# Patient Record
Sex: FEMALE | Race: WHITE | NOT HISPANIC OR LATINO | Employment: FULL TIME | URBAN - NONMETROPOLITAN AREA
[De-identification: names, ages, dates, MRNs, and addresses within clinical notes are randomized per-mention and may not be internally consistent; named-entity substitution may affect disease eponyms.]

---
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
## 2022-01-06 ENCOUNTER — OFFICE VISIT (OUTPATIENT)
Dept: URGENT CARE | Facility: PHYSICIAN GROUP | Age: 22
End: 2022-01-06
Payer: COMMERCIAL

## 2022-01-06 VITALS
RESPIRATION RATE: 12 BRPM | DIASTOLIC BLOOD PRESSURE: 64 MMHG | WEIGHT: 216 LBS | HEIGHT: 63 IN | TEMPERATURE: 97.1 F | BODY MASS INDEX: 38.27 KG/M2 | HEART RATE: 72 BPM | OXYGEN SATURATION: 98 % | SYSTOLIC BLOOD PRESSURE: 108 MMHG

## 2022-01-06 DIAGNOSIS — R11.2 NAUSEA AND VOMITING, INTRACTABILITY OF VOMITING NOT SPECIFIED, UNSPECIFIED VOMITING TYPE: ICD-10-CM

## 2022-01-06 DIAGNOSIS — R10.9 BELLY PAIN: ICD-10-CM

## 2022-01-06 DIAGNOSIS — B34.9 VIRAL ILLNESS: ICD-10-CM

## 2022-01-06 DIAGNOSIS — R51.9 NONINTRACTABLE HEADACHE, UNSPECIFIED CHRONICITY PATTERN, UNSPECIFIED HEADACHE TYPE: ICD-10-CM

## 2022-01-06 LAB
APPEARANCE UR: CLEAR
BILIRUB UR STRIP-MCNC: NORMAL MG/DL
COLOR UR AUTO: YELLOW
EXTERNAL QUALITY CONTROL: NORMAL
GLUCOSE UR STRIP.AUTO-MCNC: NORMAL MG/DL
INT CON NEG: NORMAL
INT CON POS: NORMAL
KETONES UR STRIP.AUTO-MCNC: NORMAL MG/DL
LEUKOCYTE ESTERASE UR QL STRIP.AUTO: NORMAL
NITRITE UR QL STRIP.AUTO: NORMAL
PH UR STRIP.AUTO: 5.5 [PH] (ref 5–8)
POC URINE PREGNANCY TEST: NORMAL
PROT UR QL STRIP: NORMAL MG/DL
RBC UR QL AUTO: NORMAL
SARS-COV+SARS-COV-2 AG RESP QL IA.RAPID: NEGATIVE
SP GR UR STRIP.AUTO: 1.02
UROBILINOGEN UR STRIP-MCNC: 0.2 MG/DL

## 2022-01-06 PROCEDURE — 81002 URINALYSIS NONAUTO W/O SCOPE: CPT | Performed by: FAMILY MEDICINE

## 2022-01-06 PROCEDURE — 87426 SARSCOV CORONAVIRUS AG IA: CPT | Performed by: FAMILY MEDICINE

## 2022-01-06 PROCEDURE — 81025 URINE PREGNANCY TEST: CPT | Performed by: FAMILY MEDICINE

## 2022-01-06 PROCEDURE — 99214 OFFICE O/P EST MOD 30 MIN: CPT | Mod: 25 | Performed by: FAMILY MEDICINE

## 2022-01-06 RX ORDER — PROMETHAZINE HYDROCHLORIDE 25 MG/1
25 TABLET ORAL EVERY 8 HOURS PRN
Qty: 15 TABLET | Refills: 0 | Status: SHIPPED | OUTPATIENT
Start: 2022-01-06 | End: 2022-12-16

## 2022-01-06 ASSESSMENT — ENCOUNTER SYMPTOMS
HEADACHES: 1
NAUSEA: 1
ABDOMINAL PAIN: 1
VOMITING: 1
MYALGIAS: 1
FEVER: 1

## 2022-01-06 NOTE — LETTER
January 6, 2022         Patient: Eva Yi   YOB: 2000   Date of Visit: 1/6/2022           To Whom it May Concern:    Eva Yi was seen in my clinic on 1/6/2022. She may return to work in 2-3 days.    If you have any questions or concerns, please don't hesitate to call.        Sincerely,           Nathan Garcia M.D.  Electronically Signed

## 2022-01-06 NOTE — LETTER
January 6, 2022         Patient: Eva Yi   YOB: 2000   Date of Visit: 1/6/2022           To Whom it May Concern:    Eva Yi was seen in my clinic on 1/6/2022. She may return to work in 2-3 days, excuse and recent missed days of work this week.    If you have any questions or concerns, please don't hesitate to call.        Sincerely,           Nathan Garcia M.D.  Electronically Signed

## 2022-01-07 NOTE — PROGRESS NOTES
"Subjective     Eva Yi is a 21 y.o. female who presents with Vomiting (x2 dayd), Abdominal Pain, and Headache (x3 days)      - This is a pleasant and nontoxic appearing 21 y.o. female who has come to the walk-in clinic today for:    #1) ~3 days started w/ feeling sick (aches-malaise, headaches, subjective fever, upset stomach, nausea and vomited once). Able to eat/drink, no diarrhea. No cough/sinus.       ALLERGIES:  Pcn [penicillins]     PMH:  Past Medical History:   Diagnosis Date   • ADHD (attention deficit hyperactivity disorder)    • Allergy    • ASTHMA         PSH:  History reviewed. No pertinent surgical history.    MEDS:    Current Outpatient Medications:   •  promethazine (PHENERGAN) 25 MG Tab, Take 1 Tablet by mouth every 8 hours as needed for Nausea/Vomiting., Disp: 15 Tablet, Rfl: 0  •  Norgestim-Eth Estrad Triphasic (ORTHO TRI-CYCLEN, 28,) 0.18/0.215/0.25 MG-35 MCG Tab, Take 1 tablet by mouth every day. Begin medication the first day of your next menstrual period., Disp: 28 tablet, Rfl: 11  •  albuterol 108 (90 Base) MCG/ACT Aero Soln inhalation aerosol, Inhale 1-2 Puffs by mouth every 6 hours as needed for Shortness of Breath., Disp: 8.5 g, Rfl: 0    ** I have documented what I find to be significant in regards to past medical, social, family and surgical history  in my HPI or under PMH/PSH/FH review section, otherwise it is noncontributory **           HPI    Review of Systems   Constitutional: Positive for fever and malaise/fatigue.   Gastrointestinal: Positive for abdominal pain, nausea and vomiting.   Musculoskeletal: Positive for myalgias.   Neurological: Positive for headaches.   All other systems reviewed and are negative.             Objective     /64   Pulse 72   Temp 36.2 °C (97.1 °F)   Resp 12   Ht 1.6 m (5' 3\")   Wt 98 kg (216 lb) Comment: with shoes  SpO2 98%   BMI 38.26 kg/m²      Physical Exam  Vitals and nursing note reviewed.   Constitutional:       General: She is " not in acute distress.     Appearance: Normal appearance. She is well-developed.   HENT:      Head: Normocephalic and atraumatic.      Mouth/Throat:      Mouth: Mucous membranes are moist.      Pharynx: Oropharynx is clear.   Eyes:      General: No scleral icterus.  Cardiovascular:      Heart sounds: Normal heart sounds. No murmur heard.      Pulmonary:      Effort: Pulmonary effort is normal. No respiratory distress.      Breath sounds: Normal breath sounds.   Abdominal:      Palpations: Abdomen is soft.      Tenderness: There is no abdominal tenderness.   Skin:     Coloration: Skin is not jaundiced or pale.   Neurological:      Mental Status: She is alert.      Motor: No abnormal muscle tone.   Psychiatric:         Mood and Affect: Mood normal.         Behavior: Behavior normal.         Assessment & Plan       1. Nausea and vomiting, intractability of vomiting not specified, unspecified vomiting type  promethazine (PHENERGAN) 25 MG Tab   2. Nonintractable headache, unspecified chronicity pattern, unspecified headache type     3. Belly pain  POCT Urinalysis    POCT Pregnancy   4. Viral illness  POCT SARS-COV Antigen JUSTINE (Symptomatic Only)       - Dx, plan & d/c instructions discussed   - Rest, stay hydrated, OTC Motrin and/or Tylenol as needed  - E.R. precautions discussed     Asked to kindly follow up with their PCP's office in 2-3 days for a recheck, ER if not improving or feeling/getting worse.    Any realistic side effects of medications that may have been given today reviewed.     Patient left in stable condition     POCT results reviewed/discussed

## 2022-03-08 ENCOUNTER — OFFICE VISIT (OUTPATIENT)
Dept: MEDICAL GROUP | Facility: CLINIC | Age: 22
End: 2022-03-08
Payer: COMMERCIAL

## 2022-03-08 VITALS
OXYGEN SATURATION: 95 % | TEMPERATURE: 97.2 F | WEIGHT: 217.8 LBS | HEIGHT: 63 IN | DIASTOLIC BLOOD PRESSURE: 78 MMHG | HEART RATE: 91 BPM | RESPIRATION RATE: 20 BRPM | BODY MASS INDEX: 38.59 KG/M2 | SYSTOLIC BLOOD PRESSURE: 126 MMHG

## 2022-03-08 DIAGNOSIS — L30.9 ECZEMA, UNSPECIFIED TYPE: ICD-10-CM

## 2022-03-08 DIAGNOSIS — Z30.09 BIRTH CONTROL COUNSELING: ICD-10-CM

## 2022-03-08 PROCEDURE — 99214 OFFICE O/P EST MOD 30 MIN: CPT | Performed by: PHYSICIAN ASSISTANT

## 2022-03-08 RX ORDER — NORGESTIMATE AND ETHINYL ESTRADIOL 7DAYSX3 28
1 KIT ORAL DAILY
Qty: 90 TABLET | Refills: 3 | Status: SHIPPED | OUTPATIENT
Start: 2022-03-08 | End: 2023-02-01

## 2022-03-08 RX ORDER — TRIAMCINOLONE ACETONIDE 1 MG/G
OINTMENT TOPICAL
Qty: 150 G | Refills: 1 | Status: SHIPPED
Start: 2022-03-08 | End: 2023-01-10

## 2022-03-08 NOTE — PROGRESS NOTES
"cc:  Birth control    Subjective:     Eva Yi is a 21 y.o. female presenting for birth control      Patient presents to the office for birth control.  Patient indicates that she has not missed any doses with her birth control.  She is not having any side effects with the medication and is not having any spotting.  She states that she has been on the medication for a year.       Patient states that she has bad eczema on her hands. She did have a medication previously and states that nothing has worked.  She has seen dermatology in the past. She does wear gloves at work.  It becomes worse, then improves.  It has been rotating from better to bad for a year.  She cannot bend her finger in the morning as they are dry and crack.     Review of systems:  See above.   Denies any symptoms unless previously indicated.        Current Outpatient Medications:   •  Norgestim-Eth Estrad Triphasic (ORTHO TRI-CYCLEN, 28,) 0.18/0.215/0.25 MG-35 MCG Tab, Take 1 Tablet by mouth every day. Begin medication the first day of your next menstrual period., Disp: 90 Tablet, Rfl: 3  •  triamcinolone acetonide (KENALOG) 0.1 % Ointment, Apply a small amount to the affected area twice a day no longer than 2 weeks., Disp: 150 g, Rfl: 1  •  promethazine (PHENERGAN) 25 MG Tab, Take 1 Tablet by mouth every 8 hours as needed for Nausea/Vomiting. (Patient not taking: Reported on 3/8/2022), Disp: 15 Tablet, Rfl: 0  •  albuterol 108 (90 Base) MCG/ACT Aero Soln inhalation aerosol, Inhale 1-2 Puffs by mouth every 6 hours as needed for Shortness of Breath. (Patient not taking: Reported on 3/8/2022), Disp: 8.5 g, Rfl: 0    Allergies, past medical history, past surgical history, family history, social history reviewed and updated    Objective:     Vitals: /78 (BP Location: Left arm, Patient Position: Sitting, BP Cuff Size: Large adult)   Pulse 91   Temp 36.2 °C (97.2 °F) (Temporal)   Resp 20   Ht 1.6 m (5' 3\") Comment: stated by pt  Wt 98.8 kg " (217 lb 12.8 oz) Comment: with shoes on  LMP 03/01/2022 (Approximate)   SpO2 95%   BMI 38.58 kg/m²   General: Alert, pleasant, NAD  EYES:   PERRL, EOMI, no icterus or pallor.  Conjunctivae and lids normal.   HENT:  Normocephalic.  External ears normal.  Neck supple.     Respiratory: Normal respiratory effort.  Clear to auscultation bilaterally.  Abdomen: obese  Skin: Warm, dry, eczema hands bilaterally moderate to severe.  Musculoskeletal: Gait is normal.  Moves all extremities well.    Extremities: normal range of motion all extremities.   Neurological: No tremors, sensation grossly intact,  CN2-12 intact.  Psych:  Affect/mood is normal, judgement is good, memory is intact, grooming is appropriate.    Assessment/Plan:     Eva was seen today for medication refill.    Diagnoses and all orders for this visit:    Birth control counseling  -     Norgestim-Eth Estrad Triphasic (ORTHO TRI-CYCLEN, 28,) 0.18/0.215/0.25 MG-35 MCG Tab; Take 1 Tablet by mouth every day. Begin medication the first day of your next menstrual period.    Medication refilled at this time.  Provided education regarding Pap smear which will be done at the age of 23.    Eczema, unspecified type  -     Referral to Dermatology  -     triamcinolone acetonide (KENALOG) 0.1 % Ointment; Apply a small amount to the affected area twice a day no longer than 2 weeks.      We will submit a referral to dermatology.  Discussed treatment options with patient.  We will try triamcinolone ointment.  Patient aware of potential risks and side effects of medication.  Will let us know if she is unable to get in to see dermatology within the next few weeks.      No follow-ups on file.    Please note that this dictation was created using voice recognition software. I have made every reasonable attempt to correct obvious errors, but expect that there are errors of grammar and possible content that I did not discover before finalizing note.

## 2022-06-27 ENCOUNTER — APPOINTMENT (OUTPATIENT)
Dept: URGENT CARE | Facility: PHYSICIAN GROUP | Age: 22
End: 2022-06-27

## 2022-07-27 ENCOUNTER — OFFICE VISIT (OUTPATIENT)
Dept: URGENT CARE | Facility: PHYSICIAN GROUP | Age: 22
End: 2022-07-27
Payer: COMMERCIAL

## 2022-07-27 VITALS
BODY MASS INDEX: 39.51 KG/M2 | DIASTOLIC BLOOD PRESSURE: 84 MMHG | WEIGHT: 223 LBS | RESPIRATION RATE: 16 BRPM | HEIGHT: 63 IN | OXYGEN SATURATION: 98 % | HEART RATE: 111 BPM | SYSTOLIC BLOOD PRESSURE: 118 MMHG | TEMPERATURE: 97.8 F

## 2022-07-27 DIAGNOSIS — Z20.822 EXPOSURE TO COVID-19 VIRUS: ICD-10-CM

## 2022-07-27 DIAGNOSIS — J06.9 VIRAL URI WITH COUGH: ICD-10-CM

## 2022-07-27 DIAGNOSIS — U07.1 COVID-19: ICD-10-CM

## 2022-07-27 LAB
EXTERNAL QUALITY CONTROL: ABNORMAL
SARS-COV+SARS-COV-2 AG RESP QL IA.RAPID: POSITIVE

## 2022-07-27 PROCEDURE — 87426 SARSCOV CORONAVIRUS AG IA: CPT | Performed by: PHYSICIAN ASSISTANT

## 2022-07-27 PROCEDURE — 99213 OFFICE O/P EST LOW 20 MIN: CPT | Mod: CS | Performed by: PHYSICIAN ASSISTANT

## 2022-07-27 RX ORDER — SULFAMETHOXAZOLE AND TRIMETHOPRIM 800; 160 MG/1; MG/1
TABLET ORAL
COMMUNITY
Start: 2022-06-27 | End: 2022-12-16

## 2022-07-27 ASSESSMENT — ENCOUNTER SYMPTOMS
FEVER: 0
VOMITING: 0
EYE DISCHARGE: 0
EYE REDNESS: 0
NAUSEA: 1
SHORTNESS OF BREATH: 0
MYALGIAS: 1
HEADACHES: 1
SORE THROAT: 1
DIARRHEA: 1
COUGH: 1

## 2022-07-27 NOTE — PROGRESS NOTES
Subjective     Eva Yi is a 22 y.o. female who presents with Coronavirus Screening (/Body aches, headaches, hot and cold, at home covid test +, all symptoms began yesterday. )        URI   This is a new problem. The current episode started yesterday. The problem has been unchanged. There has been no fever. Associated symptoms include congestion, coughing, diarrhea, headaches, nausea, a plugged ear sensation and a sore throat. Pertinent negatives include no chest pain, ear pain, rash or vomiting. She has tried nothing for the symptoms.     The patient reports exposure to COVID-19, stating her parents (whom she lives with) recently tested positive for COVID-19.  The patient states she took an at-home COVID-19 test yesterday, which was positive.     PMH:  has a past medical history of ADHD (attention deficit hyperactivity disorder), Allergy, and ASTHMA.  MEDS:   Current Outpatient Medications:   •  Norgestim-Eth Estrad Triphasic (ORTHO TRI-CYCLEN, 28,) 0.18/0.215/0.25 MG-35 MCG Tab, Take 1 Tablet by mouth every day. Begin medication the first day of your next menstrual period., Disp: 90 Tablet, Rfl: 3  •  triamcinolone acetonide (KENALOG) 0.1 % Ointment, Apply a small amount to the affected area twice a day no longer than 2 weeks., Disp: 150 g, Rfl: 1  •  sulfamethoxazole-trimethoprim (BACTRIM DS) 800-160 MG tablet, TAKE 1 TABLET BY MOUTH TWICE DAILY FOR 5 DAYS (Patient not taking: Reported on 7/27/2022), Disp: , Rfl:   •  promethazine (PHENERGAN) 25 MG Tab, Take 1 Tablet by mouth every 8 hours as needed for Nausea/Vomiting. (Patient not taking: No sig reported), Disp: 15 Tablet, Rfl: 0  •  albuterol 108 (90 Base) MCG/ACT Aero Soln inhalation aerosol, Inhale 1-2 Puffs by mouth every 6 hours as needed for Shortness of Breath. (Patient not taking: No sig reported), Disp: 8.5 g, Rfl: 0  ALLERGIES:   Allergies   Allergen Reactions   • Pcn [Penicillins] Hives     SURGHX: No past surgical history on file.  SOCHX:   "reports that she has never smoked. She has never used smokeless tobacco. She reports that she does not drink alcohol and does not use drugs.  FH: Family history was reviewed, no pertinent findings to report    Review of Systems   Constitutional: Negative for fever.   HENT: Positive for congestion and sore throat. Negative for ear pain.    Eyes: Negative for discharge and redness.   Respiratory: Positive for cough. Negative for shortness of breath.    Cardiovascular: Negative for chest pain.   Gastrointestinal: Positive for diarrhea and nausea. Negative for vomiting.   Musculoskeletal: Positive for myalgias.   Skin: Negative for rash.   Neurological: Positive for headaches.              Objective     /84   Pulse (!) 111   Temp 36.6 °C (97.8 °F) (Temporal)   Resp 16   Ht 1.6 m (5' 3\")   Wt 101 kg (223 lb)   SpO2 98%   BMI 39.50 kg/m²      Physical Exam  Constitutional:       General: She is not in acute distress.     Appearance: Normal appearance. She is not ill-appearing.   HENT:      Head: Normocephalic and atraumatic.      Right Ear: External ear normal.      Left Ear: External ear normal.      Nose: Nose normal.      Mouth/Throat:      Mouth: Mucous membranes are moist.      Pharynx: Oropharynx is clear. No posterior oropharyngeal erythema.   Eyes:      Extraocular Movements: Extraocular movements intact.      Conjunctiva/sclera: Conjunctivae normal.   Cardiovascular:      Rate and Rhythm: Regular rhythm. Tachycardia present.      Heart sounds: Normal heart sounds.   Pulmonary:      Effort: Pulmonary effort is normal. No respiratory distress.      Breath sounds: Normal breath sounds. No wheezing.   Musculoskeletal:         General: Normal range of motion.      Cervical back: Normal range of motion and neck supple.   Skin:     General: Skin is warm and dry.   Neurological:      Mental Status: She is alert and oriented to person, place, and time.               Progress:  POCT Rapid COVID-19: POSITIVE    "              Assessment & Plan          1. Viral URI with cough  - POCT SARS-COV Antigen JUSTINE (Symptomatic only)    2. Exposure to COVID-19 virus    3. COVID-19    The patient's presenting symptoms and physical exam is are consistent with a viral URI with associated cough.  The patient reports recent exposure to COVID-19.  The patient's physical exam today in clinic was normal, with the exception of a mildly elevated heart rate.  The patient's lungs were clear to auscultation without wheezing, and her pulse ox was within normal limits.  The patient is nontoxic appears in no acute distress.  The patient's vital signs are stable and within normal limits.  She is afebrile today in clinic.  Discussed likely viral etiology with the patient.  The patient's POCT rapid COVID-19 testing was positive.  This is consistent with the patient's current symptoms and recent exposure.  Advised the patient to stay at home under self quarantine per CDC guidelines.  Recommend OTC medications and supportive care for symptomatic management.  Recommend patient follow-up with her PCP as needed.  Discussed return precautions with the patient, and she verbalized understanding.    Differential diagnoses, supportive care, and indications for immediate follow-up discussed with patient.   Instructed to return to clinic or nearest emergency department for any change in condition, further concerns, or worsening of symptoms.    OTC Tylenol or Motrin for fever/discomfort.  OTC cough/cold medication for symptomatic relief  OTC Supportive Care for Congestion - saline nasal spray or neti pot  Drink plenty of fluids  Advised the patient to stay at home under self-isolation until symptoms have been present for at least 5 days and are improved, and there has been no fever for at least 72 hours without the use of medications and/or no vomiting or diarrhea for 48 hours.  Work note provided  Follow-up with PCP  Return to clinic or go to the ED if symptoms  worsen or fail to improve, or if the patient should develop worsening/increasing cough, congestion, ear pain, sore throat, shortness of breath, wheezing, chest pain, fever/chills, and/or any concerning symptoms.    Discussed plan with the patient, and she agrees to the above.     I personally reviewed prior external notes and test results pertinent to today's visit.  I have independently reviewed and interpreted all diagnostics ordered during this urgent care visit.       Please note that this dictation was created using voice recognition software. I have made every reasonable attempt to correct obvious errors, but I expect that there may be errors of grammar and possibly content that I did not discover before finalizing the note.     This note was electronically signed by Lashell Luu PA-C

## 2022-07-27 NOTE — LETTER
July 27, 2022         Patient: Eva Yi   YOB: 2000   Date of Visit: 7/27/2022     To Whom it May Concern,     Your employee was seen in our clinic today.  A concern for COVID-19 was identified and your employee tested positive for COVID-19.      We are asking you to excuse absences while following self-isolation protocol per Center for Disease Control (CDC) guidelines.     In general, the CDC guidelines require a minimum quarantine of 5 days from the onset of symptoms.  If your employee's symptoms have significantly improved after 5 days without fever (temperature >100.4 F) for at least 72 hours without treatment, and no vomiting or diarrhea for at least 48 hours, then return to work is approved.  however, your employee is required to wear a mask and social distance for an additional 5 days.  If your employee is still experiencing symptoms at day 5 of quarantine, they are required to quarantine for an additional 5 days for a total of 10 days.    In general, repeat testing is not necessary and not offered through our [facility].      This is the only note that will be provided from [facility] for this visit.  Your employee will require an appointment with a primary care provider if FMLA or disability forms are required.       Sincerely,      Lashell Luu P.A.-C.  Electronically Signed

## 2022-12-16 ENCOUNTER — APPOINTMENT (OUTPATIENT)
Dept: RADIOLOGY | Facility: MEDICAL CENTER | Age: 22
End: 2022-12-16
Attending: EMERGENCY MEDICINE
Payer: COMMERCIAL

## 2022-12-16 ENCOUNTER — OFFICE VISIT (OUTPATIENT)
Dept: URGENT CARE | Facility: PHYSICIAN GROUP | Age: 22
End: 2022-12-16
Payer: COMMERCIAL

## 2022-12-16 ENCOUNTER — HOSPITAL ENCOUNTER (EMERGENCY)
Facility: MEDICAL CENTER | Age: 22
End: 2022-12-16
Attending: EMERGENCY MEDICINE
Payer: COMMERCIAL

## 2022-12-16 VITALS
BODY MASS INDEX: 37.77 KG/M2 | DIASTOLIC BLOOD PRESSURE: 67 MMHG | RESPIRATION RATE: 17 BRPM | HEIGHT: 63 IN | OXYGEN SATURATION: 95 % | TEMPERATURE: 98 F | SYSTOLIC BLOOD PRESSURE: 111 MMHG | WEIGHT: 213.19 LBS | HEART RATE: 85 BPM

## 2022-12-16 VITALS
HEIGHT: 63 IN | HEART RATE: 94 BPM | OXYGEN SATURATION: 98 % | DIASTOLIC BLOOD PRESSURE: 60 MMHG | BODY MASS INDEX: 37.92 KG/M2 | TEMPERATURE: 97 F | WEIGHT: 214 LBS | RESPIRATION RATE: 16 BRPM | SYSTOLIC BLOOD PRESSURE: 110 MMHG

## 2022-12-16 DIAGNOSIS — R50.9 FEVER AND CHILLS: ICD-10-CM

## 2022-12-16 DIAGNOSIS — R10.11 RUQ PAIN: ICD-10-CM

## 2022-12-16 DIAGNOSIS — B34.9 VIRAL ILLNESS: ICD-10-CM

## 2022-12-16 LAB
ALBUMIN SERPL BCP-MCNC: 4.5 G/DL (ref 3.2–4.9)
ALBUMIN/GLOB SERPL: 1.3 G/DL
ALP SERPL-CCNC: 104 U/L (ref 30–99)
ALT SERPL-CCNC: 22 U/L (ref 2–50)
ANION GAP SERPL CALC-SCNC: 15 MMOL/L (ref 7–16)
APPEARANCE UR: CLEAR
AST SERPL-CCNC: 19 U/L (ref 12–45)
BASOPHILS # BLD AUTO: 0.4 % (ref 0–1.8)
BASOPHILS # BLD: 0.03 K/UL (ref 0–0.12)
BILIRUB SERPL-MCNC: 0.2 MG/DL (ref 0.1–1.5)
BILIRUB UR QL STRIP.AUTO: NEGATIVE
BUN SERPL-MCNC: 9 MG/DL (ref 8–22)
CALCIUM ALBUM COR SERPL-MCNC: 9.2 MG/DL (ref 8.5–10.5)
CALCIUM SERPL-MCNC: 9.6 MG/DL (ref 8.4–10.2)
CHLORIDE SERPL-SCNC: 99 MMOL/L (ref 96–112)
CO2 SERPL-SCNC: 21 MMOL/L (ref 20–33)
COLOR UR: YELLOW
CREAT SERPL-MCNC: 0.56 MG/DL (ref 0.5–1.4)
EOSINOPHIL # BLD AUTO: 0 K/UL (ref 0–0.51)
EOSINOPHIL NFR BLD: 0 % (ref 0–6.9)
ERYTHROCYTE [DISTWIDTH] IN BLOOD BY AUTOMATED COUNT: 35.6 FL (ref 35.9–50)
FLUAV RNA SPEC QL NAA+PROBE: NEGATIVE
FLUBV RNA SPEC QL NAA+PROBE: NEGATIVE
GFR SERPLBLD CREATININE-BSD FMLA CKD-EPI: 134 ML/MIN/1.73 M 2
GLOBULIN SER CALC-MCNC: 3.6 G/DL (ref 1.9–3.5)
GLUCOSE SERPL-MCNC: 85 MG/DL (ref 65–99)
GLUCOSE UR STRIP.AUTO-MCNC: NEGATIVE MG/DL
HCG SERPL QL: NEGATIVE
HCT VFR BLD AUTO: 41.9 % (ref 37–47)
HGB BLD-MCNC: 14.2 G/DL (ref 12–16)
IMM GRANULOCYTES # BLD AUTO: 0.02 K/UL (ref 0–0.11)
IMM GRANULOCYTES NFR BLD AUTO: 0.3 % (ref 0–0.9)
KETONES UR STRIP.AUTO-MCNC: 15 MG/DL
LEUKOCYTE ESTERASE UR QL STRIP.AUTO: NEGATIVE
LIPASE SERPL-CCNC: 20 U/L (ref 7–58)
LYMPHOCYTES # BLD AUTO: 1.71 K/UL (ref 1–4.8)
LYMPHOCYTES NFR BLD: 21.9 % (ref 22–41)
MCH RBC QN AUTO: 28.7 PG (ref 27–33)
MCHC RBC AUTO-ENTMCNC: 33.9 G/DL (ref 33.6–35)
MCV RBC AUTO: 84.8 FL (ref 81.4–97.8)
MICRO URNS: ABNORMAL
MONOCYTES # BLD AUTO: 0.84 K/UL (ref 0–0.85)
MONOCYTES NFR BLD AUTO: 10.8 % (ref 0–13.4)
NEUTROPHILS # BLD AUTO: 5.21 K/UL (ref 2–7.15)
NEUTROPHILS NFR BLD: 66.6 % (ref 44–72)
NITRITE UR QL STRIP.AUTO: NEGATIVE
NRBC # BLD AUTO: 0 K/UL
NRBC BLD-RTO: 0 /100 WBC
PH UR STRIP.AUTO: 6 [PH] (ref 5–8)
PLATELET # BLD AUTO: 250 K/UL (ref 164–446)
PMV BLD AUTO: 9.7 FL (ref 9–12.9)
POTASSIUM SERPL-SCNC: 3.7 MMOL/L (ref 3.6–5.5)
PROT SERPL-MCNC: 8.1 G/DL (ref 6–8.2)
PROT UR QL STRIP: NEGATIVE MG/DL
RBC # BLD AUTO: 4.94 M/UL (ref 4.2–5.4)
RBC UR QL AUTO: NEGATIVE
RSV RNA SPEC QL NAA+PROBE: NEGATIVE
SARS-COV-2 RNA RESP QL NAA+PROBE: NOTDETECTED
SODIUM SERPL-SCNC: 135 MMOL/L (ref 135–145)
SP GR UR STRIP.AUTO: 1.01
SPECIMEN SOURCE: NORMAL
WBC # BLD AUTO: 7.8 K/UL (ref 4.8–10.8)

## 2022-12-16 PROCEDURE — 99214 OFFICE O/P EST MOD 30 MIN: CPT | Performed by: NURSE PRACTITIONER

## 2022-12-16 PROCEDURE — 80053 COMPREHEN METABOLIC PANEL: CPT

## 2022-12-16 PROCEDURE — 76705 ECHO EXAM OF ABDOMEN: CPT

## 2022-12-16 PROCEDURE — 36415 COLL VENOUS BLD VENIPUNCTURE: CPT

## 2022-12-16 PROCEDURE — 0241U HCHG SARS-COV-2 COVID-19 NFCT DS RESP RNA 4 TRGT MIC: CPT

## 2022-12-16 PROCEDURE — 99284 EMERGENCY DEPT VISIT MOD MDM: CPT

## 2022-12-16 PROCEDURE — 84703 CHORIONIC GONADOTROPIN ASSAY: CPT

## 2022-12-16 PROCEDURE — 81003 URINALYSIS AUTO W/O SCOPE: CPT

## 2022-12-16 PROCEDURE — 83690 ASSAY OF LIPASE: CPT

## 2022-12-16 PROCEDURE — C9803 HOPD COVID-19 SPEC COLLECT: HCPCS | Performed by: EMERGENCY MEDICINE

## 2022-12-16 PROCEDURE — 85025 COMPLETE CBC W/AUTO DIFF WBC: CPT

## 2022-12-16 RX ORDER — LIDOCAINE HYDROCHLORIDE 20 MG/ML
5 SOLUTION OROPHARYNGEAL PRN
Status: SHIPPED | OUTPATIENT
Start: 2022-12-16 | End: 2022-12-19

## 2022-12-16 RX ORDER — IBUPROFEN 800 MG/1
800 TABLET ORAL EVERY 8 HOURS PRN
Qty: 30 TABLET | Refills: 0 | Status: SHIPPED | OUTPATIENT
Start: 2022-12-16 | End: 2024-02-05

## 2022-12-16 NOTE — PROGRESS NOTES
Chief Complaint   Patient presents with    Chills    Headache     With Nausea. X 1 day hot and cold flashes, dizziness.     Oral Pain     Sore in and around mouth.        HISTORY OF PRESENT ILLNESS: Patient is a pleasant 22 y.o. female who presents to urgent care today with complaints of abdominal pain, nausea, fever, chills.  Her pain is generalized.  Denies any urinary symptoms, cough, congestion, sore throat.  She denies previous history of the same.  Denies any known ill contacts.  She does note some sores to the right side of her tongue as well.    Patient Active Problem List    Diagnosis Date Noted    Birth control counseling 05/03/2021    Sports physical 11/07/2018    Newly recognized heart murmur 11/07/2018    Overweight, pediatric, BMI (body mass index) 95-99% for age 09/12/2017    Mild intermittent asthma without complication 09/12/2017    Eczema 06/30/2016    Attention deficit hyperactivity disorder 06/30/2016    Seasonal allergies 04/26/2013       Allergies:Pcn [penicillins]    Current Outpatient Medications Ordered in Epic   Medication Sig Dispense Refill    Norgestim-Eth Estrad Triphasic (ORTHO TRI-CYCLEN, 28,) 0.18/0.215/0.25 MG-35 MCG Tab Take 1 Tablet by mouth every day. Begin medication the first day of your next menstrual period. 90 Tablet 3    albuterol 108 (90 Base) MCG/ACT Aero Soln inhalation aerosol Inhale 1-2 Puffs by mouth every 6 hours as needed for Shortness of Breath. 8.5 g 0    triamcinolone acetonide (KENALOG) 0.1 % Ointment Apply a small amount to the affected area twice a day no longer than 2 weeks. (Patient not taking: Reported on 12/16/2022) 150 g 1     No current [facility]-ordered facility-administered medications on file.       Past Medical History:   Diagnosis Date    ADHD (attention deficit hyperactivity disorder)     Allergy     ASTHMA        Social History     Tobacco Use    Smoking status: Never    Smokeless tobacco: Never   Vaping Use    Vaping Use: Never used   Substance Use  "Topics    Alcohol use: No     Alcohol/week: 0.0 oz    Drug use: No       Family Status   Relation Name Status    Mo  Alive    Fa  Alive    Sis  Alive    Bro  Alive    PGMo  Alive    PGFa      Sis  Alive     Family History   Problem Relation Age of Onset    Alcohol/Drug Mother     Psychiatric Illness Mother     Other Mother     No Known Problems Father     Hypertension Paternal Grandmother        ROS:  Review of Systems   Constitutional: Positive for fever, chills, body aches, malaise, and fatigue.   HENT: Positive for oral pain.  Negative for ear pain, nosebleeds, congestion, sore throat and neck pain.    Eyes: Negative for vision changes.   Neuro: Negative for headache, sensory changes, weakness, seizure, LOC.   Cardiovascular: Negative for chest pain, palpitations, orthopnea and leg swelling.   Respiratory: Negative for cough, sputum production, shortness of breath and wheezing.   Gastrointestinal: Positive for abdominal pain, nausea.  Negative for diarrhea.   Genitourinary: Negative for dysuria, urgency and frequency.  Musculoskeletal: Negative for falls, neck pain, back pain, joint pain, myalgias.   Skin: Negative for rash, diaphoresis.     Exam:  /60   Pulse 94   Temp 36.1 °C (97 °F) (Temporal)   Resp 16   Ht 1.6 m (5' 3\")   Wt 97.1 kg (214 lb)   SpO2 98%   General: well-nourished, well-developed female in NAD  Head: normocephalic, atraumatic  Eyes: PERRLA, no conjunctival injection, acuity grossly intact, lids normal.  Ears: normal shape and symmetry, no tenderness, no discharge. External canals are without any significant edema or erythema. Tympanic membranes are without any inflammation, no effusion. Gross auditory acuity is intact.  Nose: symmetrical without tenderness, no discharge.  Mouth/Throat: reasonable hygiene, no erythema, exudates or tonsillar enlargement.  Small ulcerated lesions noted to right side of tongue.  Neck: no masses, range of motion within normal limits, no tracheal " deviation. No obvious thyroid enlargement.   Lymph: no cervical adenopathy. No supraclavicular adenopathy.   Neuro: alert and oriented. Cranial nerves 1-12 grossly intact. No sensory deficit.   Cardiovascular: regular rate and rhythm. No edema.  Pulmonary: no distress. Chest is symmetrical with respiration, no wheezes, crackles, or rhonchi.   Abdomen: soft, right upper quadrant tenderness with guarding, no hepatosplenomegaly.  Musculoskeletal: no clubbing, appropriate muscle tone, gait is stable.  Skin: warm, dry, intact, no clubbing, no cyanosis, no rashes.   Psych: appropriate mood, affect, judgement.         Assessment/Plan:  1. RUQ pain        2. Fever and chills              The patient is a 22-year-old female who presents with generalized abdominal pain along with fever and chills for the past 2 days.  Upon examination the patient does have right upper quadrant tenderness with guarding. Differential diagnoses include but are not limited to: viral process, cholelithiasis, cholecystitis.  Unfortunately we do not have influenza testing available to rule out viral process in the urgent care.  Nevertheless, at this time, I feel the patient requires a higher level of care in the ED for closer monitoring, stat lab work and/or imaging for further evaluation. This has been discussed with the patient and she states agreement and understanding. The patient is stable to leave [facility] at this time and will go directly to ED without delay.           Please note that this dictation was created using voice recognition software. I have made every reasonable attempt to correct obvious errors, but I expect that there are errors of grammar and possibly content that I did not discover before finalizing the note.      JS Maciel.

## 2022-12-16 NOTE — ED TRIAGE NOTES
"Chief Complaint   Patient presents with    Abdominal Pain    Nausea    Diarrhea    Fever     Tmax 99.7.  Above symptoms x2 days.  Went to u/c in [place] and upon palpation severe RUQ pain.  Urgent care sent her here.      BP (!) 137/94   Pulse 86   Temp 36.7 °C (98 °F) (Temporal)   Resp 18   Ht 1.6 m (5' 3\")   Wt 96.7 kg (213 lb 3 oz)   LMP 12/01/2022   SpO2 97%   BMI 37.76 kg/m²     "

## 2022-12-17 NOTE — ED PROVIDER NOTES
ED Provider Note    CHIEF COMPLAINT  Chief Complaint   Patient presents with    Abdominal Pain    Nausea    Diarrhea    Fever     Tmax 99.7.  Above symptoms x2 days.  Went to u/c in [place] and upon palpation severe RUQ pain.  Urgent care sent her here.        LIMITATION TO HISTORY   Select: : None    HPI  Eva Yi is a 22 y.o. female who presents here for evaluation of abdominal pain.  Patient states that she has had some nausea and diarrhea over the last few days, and states it is been persistent.  She has right upper quadrant abdominal pain, that is nonradiating, and not alleviated by anything in particular.  She was seen and evaluated by the urgent care, who told her to come here for an ultrasound after their exam showed some pain with the right upper quadrant.  She has no lower abdominal pain, no chest pain, and no shortness of breath.  She has fevers around 99.7 per her, but nothing higher than that.    OUTSIDE HISTORIAN(S):  Select: Significant other      EXTERNAL RECORDS REVIEWED  Select: Other none    REVIEW OF SYSTEMS  See HPI for further details. All other systems are negative.     PAST MEDICAL HISTORY   has a past medical history of ADHD (attention deficit hyperactivity disorder), Allergy, and ASTHMA.    SOCIAL HISTORY  Social History     Tobacco Use    Smoking status: Never    Smokeless tobacco: Never   Vaping Use    Vaping Use: Never used   Substance and Sexual Activity    Alcohol use: No     Alcohol/week: 0.0 oz    Drug use: No    Sexual activity: Never       SURGICAL HISTORY  patient denies any surgical history    CURRENT MEDICATIONS  Home Medications       Reviewed by Desirae Smith R.N. (Registered Nurse) on 12/16/22 at 1402  Med List Status: Not Addressed     Medication Last Dose Status   albuterol 108 (90 Base) MCG/ACT Aero Soln inhalation aerosol  Active   Norgestim-Eth Estrad Triphasic (ORTHO TRI-CYCLEN, 28,) 0.18/0.215/0.25 MG-35 MCG Tab  Active   triamcinolone acetonide (KENALOG) 0.1 %  "Ointment  Active                    ALLERGIES  Allergies   Allergen Reactions    Pcn [Penicillins] Hives       PHYSICAL EXAM  VITAL SIGNS: BP (!) 137/94   Pulse 86   Temp 36.7 °C (98 °F) (Temporal)   Resp 18   Ht 1.6 m (5' 3\")   Wt 96.7 kg (213 lb 3 oz)   LMP 12/01/2022   SpO2 97%   BMI 37.76 kg/m²    Constitutional: Well developed, well nourished. No acute distress.  HEENT: Normocephalic, atraumatic. Posterior pharynx clear and moist.  Eyes:  EOMI. Normal sclera.  Neck: Supple, Full range of motion, nontender.  Chest/Pulmonary: clear to ausculation. Symmetrical expansion.   Cardio: Regular rate and rhythm with no murmur.   Abdomen: Soft, tenderness to the right upper quadrant, No peritoneal signs. No guarding. No palpable masses.  Musculoskeletal: No deformity, no edema, neurovascular intact.   Neuro: Clear speech, appropriate, cooperative, cranial nerves II-XII grossly intact.  Psych: Normal mood and affect        DIAGNOSTIC STUDIES / PROCEDURES    EKG  none    LABS  Results for orders placed or performed during the hospital encounter of 12/16/22   CBC WITH DIFFERENTIAL   Result Value Ref Range    WBC 7.8 4.8 - 10.8 K/uL    RBC 4.94 4.20 - 5.40 M/uL    Hemoglobin 14.2 12.0 - 16.0 g/dL    Hematocrit 41.9 37.0 - 47.0 %    MCV 84.8 81.4 - 97.8 fL    MCH 28.7 27.0 - 33.0 pg    MCHC 33.9 33.6 - 35.0 g/dL    RDW 35.6 (L) 35.9 - 50.0 fL    Platelet Count 250 164 - 446 K/uL    MPV 9.7 9.0 - 12.9 fL    Neutrophils-Polys 66.60 44.00 - 72.00 %    Lymphocytes 21.90 (L) 22.00 - 41.00 %    Monocytes 10.80 0.00 - 13.40 %    Eosinophils 0.00 0.00 - 6.90 %    Basophils 0.40 0.00 - 1.80 %    Immature Granulocytes 0.30 0.00 - 0.90 %    Nucleated RBC 0.00 /100 WBC    Neutrophils (Absolute) 5.21 2.00 - 7.15 K/uL    Lymphs (Absolute) 1.71 1.00 - 4.80 K/uL    Monos (Absolute) 0.84 0.00 - 0.85 K/uL    Eos (Absolute) 0.00 0.00 - 0.51 K/uL    Baso (Absolute) 0.03 0.00 - 0.12 K/uL    Immature Granulocytes (abs) 0.02 0.00 - 0.11 K/uL " "   NRBC (Absolute) 0.00 K/uL   COMP METABOLIC PANEL   Result Value Ref Range    Sodium 135 135 - 145 mmol/L    Potassium 3.7 3.6 - 5.5 mmol/L    Chloride 99 96 - 112 mmol/L    Co2 21 20 - 33 mmol/L    Anion Gap 15.0 7.0 - 16.0    Glucose 85 65 - 99 mg/dL    Bun 9 8 - 22 mg/dL    Creatinine 0.56 0.50 - 1.40 mg/dL    Calcium 9.6 8.4 - 10.2 mg/dL    AST(SGOT) 19 12 - 45 U/L    ALT(SGPT) 22 2 - 50 U/L    Alkaline Phosphatase 104 (H) 30 - 99 U/L    Total Bilirubin 0.2 0.1 - 1.5 mg/dL    Albumin 4.5 3.2 - 4.9 g/dL    Total Protein 8.1 6.0 - 8.2 g/dL    Globulin 3.6 (H) 1.9 - 3.5 g/dL    A-G Ratio 1.3 g/dL   LIPASE   Result Value Ref Range    Lipase 20 7 - 58 U/L   URINALYSIS,CULTURE IF INDICATED    Specimen: Urine, Clean Catch; Blood   Result Value Ref Range    Color Yellow     Character Clear     Specific Gravity 1.010 <1.035    Ph 6.0 5.0 - 8.0    Glucose Negative Negative mg/dL    Ketones 15 (A) Negative mg/dL    Protein Negative Negative mg/dL    Bilirubin Negative Negative    Nitrite Negative Negative    Leukocyte Esterase Negative Negative    Occult Blood Negative Negative    Micro Urine Req see below    HCG QUAL SERUM   Result Value Ref Range    Beta-Hcg Qualitative Serum Negative Negative   CORRECTED CALCIUM   Result Value Ref Range    Correct Calcium 9.2 8.5 - 10.5 mg/dL   ESTIMATED GFR   Result Value Ref Range    GFR (CKD-EPI) 134 >60 mL/min/1.73 m 2         RADIOLOGY  US-RUQ   Final Result      1.  Hepatomegaly with evidence of hepatic steatosis.              COURSE & MEDICAL DECISION MAKING  Pertinent Labs & Imaging studies reviewed. (See chart for details)  5:54 PM  This time, the patient is nontoxic-appearing, and afebrile.  She states she feels \"really cold\" despite being under blankets.  She also states that she does have some mild body aches.  Her lab work is unremarkable, her pregnancy test is negative, and her ultrasound is negative.  Patient states that she has no pain other than when pushing on her " upper abdomen, right more than left-sided.  She has no other medical concerns at this time.  I did offer to do a CT scan on the patient, she states that she would rather have a flu test done, medications for pain and discomfort, and she will return here for any further issues or concerns.  Patient is appropriate for outpatient management.  I reviewed the studies of lab work and ultrasound.  She is appropriate for outpatient services            DISCUSSION OF MANAGEMENT WITH OTHER PHYSICIANS, HP OR APPROPRIATE SOURCE  Select: None    INDEPENDENT INTERPRETATION OF STUDIES  Select: Ultrasound(s)      ESCALATION OF CARE  Appropriate for outpatient management patient is appropriate for outpatient services    SOCIAL DETERMINANTS THAT SIGNIFICANTLY AFFECT CARE  Select: None    PRESCRIPTION DRUG CONSIDERED  Select: Pain Medications      DIAGNOSTICS TESTS CONSIDERED  We did entertain the idea of doing a CT scan, however patient states that she would rather go home and see how she does, prior to having 1 done.         @@    The patient will not drink alcohol nor drive with prescribed medications. The patient will return for worsening symptoms and is stable at the time of discharge. The patient verbalizes understanding and will comply.    FINAL IMPRESSION  1. Abdominal pain          Electronically signed by: Benitez Eubanks D.O., 12/16/2022 4:26 PM

## 2022-12-18 ENCOUNTER — HOSPITAL ENCOUNTER (EMERGENCY)
Facility: MEDICAL CENTER | Age: 22
End: 2022-12-18
Attending: EMERGENCY MEDICINE
Payer: COMMERCIAL

## 2022-12-18 VITALS
HEIGHT: 63 IN | HEART RATE: 82 BPM | DIASTOLIC BLOOD PRESSURE: 78 MMHG | WEIGHT: 212.08 LBS | OXYGEN SATURATION: 95 % | BODY MASS INDEX: 37.58 KG/M2 | RESPIRATION RATE: 16 BRPM | SYSTOLIC BLOOD PRESSURE: 123 MMHG | TEMPERATURE: 98.1 F

## 2022-12-18 DIAGNOSIS — B00.2 HERPETIC GINGIVOSTOMATITIS: ICD-10-CM

## 2022-12-18 PROCEDURE — 99282 EMERGENCY DEPT VISIT SF MDM: CPT

## 2022-12-18 RX ORDER — HYDROCODONE BITARTRATE AND ACETAMINOPHEN 5; 325 MG/1; MG/1
1 TABLET ORAL EVERY 6 HOURS PRN
Qty: 12 TABLET | Refills: 0 | Status: SHIPPED | OUTPATIENT
Start: 2022-12-18 | End: 2022-12-23

## 2022-12-18 RX ORDER — ACYCLOVIR 200 MG/1
400 CAPSULE ORAL
Qty: 50 CAPSULE | Refills: 0 | Status: SHIPPED | OUTPATIENT
Start: 2022-12-18 | End: 2022-12-23

## 2022-12-18 ASSESSMENT — FIBROSIS 4 INDEX: FIB4 SCORE: 0.32

## 2022-12-18 NOTE — ED PROVIDER NOTES
"ED Provider Note    CHIEF COMPLAINT  Chief Complaint   Patient presents with    Sore Throat     Sore throat, sores in mouth, swollen tonsils and neck and head pain.       HPI  Eva Yi is a 22 y.o. female who presents for evaluation of sore throat.  The patient states for the last 2 days she has developed \"canker sores\".  The patient complains of painful lesions over her lips and her throat.  Patient was seen a couple days ago for abdominal pain and the work-up was unremarkable.  Patient's had a low-grade fever.  She has had no significant: Purulent rhinorrhea, cough, sputum, shortness of breath, vomiting, diarrhea.  No rashes.  No other acute symptomatology or complaints.    REVIEW OF SYSTEMS  See HPI for further details.  No major health problems such as hypertension, diabetes, seizures, cardiac disorders, gastrointestinal disorders, genitourinary disorders.  She denies pregnancy.  All other systems negative.    PAST MEDICAL HISTORY  Past Medical History:   Diagnosis Date    ADHD (attention deficit hyperactivity disorder)     Allergy     ASTHMA        FAMILY HISTORY  Family History   Problem Relation Age of Onset    Alcohol/Drug Mother     Psychiatric Illness Mother     Other Mother     No Known Problems Father     Hypertension Paternal Grandmother        SOCIAL HISTORY  Resides in [place], [place]    SURGICAL HISTORY  History reviewed. No pertinent surgical history.    CURRENT MEDICATIONS  See his notes    ALLERGIES  Allergies   Allergen Reactions    Pcn [Penicillins] Hives       PHYSICAL EXAM  VITAL SIGNS: BP (!) 142/91   Pulse 84   Temp 36.6 °C (97.8 °F) (Temporal)   Resp 16   Ht 1.6 m (5' 3\")   Wt 96.2 kg (212 lb 1.3 oz)   LMP 12/01/2022   SpO2 96%   BMI 37.57 kg/m²    Constitutional: 22-year-old female, well developed, Well nourished, phonation is normal no respiratory distress  HENT: Normocephalic, Atraumatic, Nares:Clear, Oropharynx: moist, well hydrated; the patient has oval ulcerative lesions " over the inner lips and posterior pharynx with no associated exudate;  Eyes: PERRL, EOMI, Conjunctiva normal, No discharge.   Neck: Normal range of motion, No tenderness, Supple, No stridor.   Lymphatic: Shotty semitubular lymphadenopathy noted.   Cardiovascular: Regular rate and rhythm without mumurs, gallups, rubs   Thorax & Lungs: Normal Equal breath sounds, No respiratory distress, No wheezing, no stridor, no rales. No chest tenderness.   Abdomen: Soft, nontender, nondistended, no organomegaly, positive bowel sounds normal in quality. No guarding or rebound.  Skin: Good skin turgor, pink, warm, dry. No rashes, petechiae, purpura. Normal capillary refill.   Back: No tenderness, No CVA tenderness.   Extremities: Intact distal pulses, No edema, No tenderness, No cyanosis,  Vascular: Pulses are 2+, symmetric in the upper and lower extremities.  Musculoskeletal: Good range of motion in all major joints. No tenderness to palpation or major deformities noted.   Neurologic: Alert & oriented x 3,  No gross focal deficits noted.   Psychiatric: Affect normal, Judgment normal, Mood normal.       RADIOLOGY/PROCEDURES  Previous imaging studies reviewed    COURSE & MEDICAL DECISION MAKING  Pertinent Labs & Imaging studies reviewed. (See chart for details)  Discussion: At this time, the patient presents with findings consistent with herpetic gingivostomatitis.  I discussed condition with the patient.  I will attempt a trial of antiviral therapy.  She instructed to keep her self well-hydrated instructions regarding this were given.  Over-the-counter medications were discussed.  I see no indication for emergent laboratory studies or imaging studies.  She and her  indicate they are comfortable with this explanation disposition.    FINAL IMPRESSION  1. Herpetic gingivostomatitis           PLAN  1.  Appropriate discharge instructions given  2.  Acyclovir 400 mg 5 times daily x5 days  3.  Lortab 5 mg #12;  databank  reviewed; informed consent obtained  4.  Tylenol and/or ibuprofen for pain  5.  Encouraging hydration and oral intake  6.  Follow-up with primary care    Electronically signed by: Guy G Gansert, M.D., 12/18/2022 11:23 AM

## 2022-12-19 ENCOUNTER — OFFICE VISIT (OUTPATIENT)
Dept: MEDICAL GROUP | Facility: CLINIC | Age: 22
End: 2022-12-19
Payer: COMMERCIAL

## 2022-12-19 VITALS
HEIGHT: 64 IN | BODY MASS INDEX: 35.68 KG/M2 | SYSTOLIC BLOOD PRESSURE: 126 MMHG | DIASTOLIC BLOOD PRESSURE: 84 MMHG | TEMPERATURE: 97.4 F | HEART RATE: 100 BPM | RESPIRATION RATE: 16 BRPM | OXYGEN SATURATION: 93 % | WEIGHT: 209 LBS

## 2022-12-19 DIAGNOSIS — K59.09 OTHER CONSTIPATION: ICD-10-CM

## 2022-12-19 DIAGNOSIS — B00.2 HERPETIC GINGIVOSTOMATITIS: ICD-10-CM

## 2022-12-19 DIAGNOSIS — R07.89 OTHER CHEST PAIN: ICD-10-CM

## 2022-12-19 DIAGNOSIS — N92.6 IRREGULAR PERIODS: ICD-10-CM

## 2022-12-19 DIAGNOSIS — N92.6 MENSTRUAL ABNORMALITY: ICD-10-CM

## 2022-12-19 PROBLEM — Z02.5 SPORTS PHYSICAL: Status: RESOLVED | Noted: 2018-11-07 | Resolved: 2022-12-19

## 2022-12-19 PROCEDURE — 93000 ELECTROCARDIOGRAM COMPLETE: CPT | Performed by: PHYSICIAN ASSISTANT

## 2022-12-19 PROCEDURE — 99214 OFFICE O/P EST MOD 30 MIN: CPT | Performed by: PHYSICIAN ASSISTANT

## 2022-12-19 RX ORDER — LIDOCAINE HYDROCHLORIDE 20 MG/ML
SOLUTION OROPHARYNGEAL
Qty: 1000 ML | Refills: 0 | Status: SHIPPED | OUTPATIENT
Start: 2022-12-19 | End: 2022-12-20 | Stop reason: SDUPTHER

## 2022-12-19 RX ORDER — DOCUSATE SODIUM 100 MG/1
200 CAPSULE, LIQUID FILLED ORAL 2 TIMES DAILY
Qty: 60 CAPSULE | Refills: 0 | Status: SHIPPED
Start: 2022-12-19 | End: 2023-01-10

## 2022-12-19 ASSESSMENT — PATIENT HEALTH QUESTIONNAIRE - PHQ9: CLINICAL INTERPRETATION OF PHQ2 SCORE: 0

## 2022-12-19 ASSESSMENT — FIBROSIS 4 INDEX: FIB4 SCORE: 0.32

## 2022-12-19 NOTE — PROGRESS NOTES
cc:  ER follow up    Subjective:     Eva iY is a 22 y.o. female presenting for ER follow up      Patient presents to the office for ER follow up. She was seen on the 16th and then on the 18th.  She was seen for abdominal pain on the 16th but was having symptoms with her mouth at that time.  She went back to the ER as her mouth was feeling worse and was diagnosed with herpetic gingivostomatitis.  She did have an ultrasound with the first visit. ultrasound reports an enlarged liver.  She states that the only time she had pain was when the urgent care provider pushed on her abdomen and she had pain.  She was diagnosed with hepatomegaly and hepatic steatosis.  Last night she states was the first time she did not feel feverish. She has been having pain for 3 days.    Patient states that she has been having chest pain and states that she has not been able to have a bowel movemement.  She states that she does not have chest pain every time she breathes.      Review of systems:  See above.   Denies any symptoms unless previously indicated.        Current Outpatient Medications:     lidocaine (XYLOCAINE) 2 % Solution, Swish and spit 5 mls every 3 hours as needed for pain, Disp: 1000 mL, Rfl: 0    docusate sodium (COLACE) 100 MG Cap, Take 2 Capsules by mouth 2 times a day., Disp: 60 Capsule, Rfl: 0    acyclovir (ZOVIRAX) 200 MG Cap, Take 2 Capsules by mouth 5 Times a Day for 5 days., Disp: 50 Capsule, Rfl: 0    HYDROcodone-acetaminophen (NORCO) 5-325 MG Tab per tablet, Take 1 Tablet by mouth every 6 hours as needed (pain) for up to 5 days., Disp: 12 Tablet, Rfl: 0    ibuprofen (MOTRIN) 800 MG Tab, Take 1 Tablet by mouth every 8 hours as needed for Moderate Pain., Disp: 30 Tablet, Rfl: 0    Norgestim-Eth Estrad Triphasic (ORTHO TRI-CYCLEN, 28,) 0.18/0.215/0.25 MG-35 MCG Tab, Take 1 Tablet by mouth every day. Begin medication the first day of your next menstrual period., Disp: 90 Tablet, Rfl: 3    triamcinolone acetonide  "(KENALOG) 0.1 % Ointment, Apply a small amount to the affected area twice a day no longer than 2 weeks. (Patient not taking: Reported on 12/16/2022), Disp: 150 g, Rfl: 1    albuterol 108 (90 Base) MCG/ACT Aero Soln inhalation aerosol, Inhale 1-2 Puffs by mouth every 6 hours as needed for Shortness of Breath. (Patient not taking: Reported on 12/19/2022), Disp: 8.5 g, Rfl: 0    Current Facility-Administered Medications:     lidocaine (XYLOCAINE) 2 % viscous solution 5 mL, 5 mL, Mouth/Throat, PRN, Benitez Eubanks D.O.    Allergies, past medical history, past surgical history, family history, social history reviewed and updated    Objective:     Vitals: /84 (BP Location: Left arm, Patient Position: Sitting, BP Cuff Size: Adult)   Pulse 100   Temp 36.3 °C (97.4 °F) (Temporal)   Resp 16   Ht 1.613 m (5' 3.5\")   Wt 94.8 kg (209 lb)   LMP 12/19/2022   SpO2 93%   BMI 36.44 kg/m²   General: Alert, pleasant, NAD  EYES:   PERRL, EOMI, no icterus or pallor.  Conjunctivae and lids normal.   HENT:  Normocephalic.  External ears normal.  Oropharynx erythematous with herpetic outbreak on right side of mouth and tongue.   mucous membranes moist.  Neck supple.     Heart: Regular rate and rhythm.  S1 and S2 normal.  No murmurs appreciated.  Respiratory: Normal respiratory effort.  Clear to auscultation bilaterally.  Abdomen: obese  Skin: Warm, dry, no rashes.  Musculoskeletal: Gait is normal.  Moves all extremities well.    Extremities: normal range of motion all extremities.   Neurological: No tremors, sensation grossly intact,  CN2-12 intact.  Psych:  Affect/mood is normal, judgement is good, memory is intact, grooming is appropriate.  EKG:  normal sinus normal rhythm.  Borderline TN prolonged interval.    Assessment/Plan:     Eva was seen today for spotting and cold sores.    Diagnoses and all orders for this visit:    Herpetic gingivostomatitis  -     lidocaine (XYLOCAINE) 2 % Solution; Swish and spit 5 mls every " 3 hours as needed for pain    Patient is currently on acyclovir.  It appears that lidocaine has been ordered but not submitted.  We will go ahead and submit this prescription at this time and follow-up in 2 weeks.  A note to remain out of work for the remainder of the week.  She will contact us sooner if no significant improvement in symptoms or worsening.    Menstrual abnormality  -     Cancel: POCT Pregnancy  Irregular periods  -     TSH WITH REFLEX TO FT4; Future    Potentially related to illness.  Pregnancy test in the ER is negative.  Could potentially be related to medication.  Thyroid testing has been ordered to evaluate further and will notify patient of results when received.    Other chest pain  -     EKG - Clinic Performed  Other constipation  -     docusate sodium (COLACE) 100 MG Cap; Take 2 Capsules by mouth 2 times a day.    Borderline prolonged TN interval.  Otherwise negative EKG.  Could be due to constipation as well we will try patient on Colace.  Follow-up in 2 weeks if not improved, will need to evaluate further.      Return in about 2 weeks (around 1/2/2023), or if symptoms worsen or fail to improve.    Please note that this dictation was created using voice recognition software. I have made every reasonable attempt to correct obvious errors, but expect that there are errors of grammar and possible content that I did not discover before finalizing note.

## 2022-12-20 ENCOUNTER — HOSPITAL ENCOUNTER (OUTPATIENT)
Facility: MEDICAL CENTER | Age: 22
End: 2022-12-20
Attending: PHYSICIAN ASSISTANT
Payer: COMMERCIAL

## 2022-12-20 ENCOUNTER — TELEPHONE (OUTPATIENT)
Dept: MEDICAL GROUP | Facility: CLINIC | Age: 22
End: 2022-12-20

## 2022-12-20 ENCOUNTER — NON-PROVIDER VISIT (OUTPATIENT)
Dept: MEDICAL GROUP | Facility: CLINIC | Age: 22
End: 2022-12-20
Payer: COMMERCIAL

## 2022-12-20 DIAGNOSIS — B00.2 HERPETIC GINGIVOSTOMATITIS: ICD-10-CM

## 2022-12-20 DIAGNOSIS — N92.6 IRREGULAR PERIODS: ICD-10-CM

## 2022-12-20 PROCEDURE — 36415 COLL VENOUS BLD VENIPUNCTURE: CPT | Performed by: PHYSICIAN ASSISTANT

## 2022-12-20 PROCEDURE — 84443 ASSAY THYROID STIM HORMONE: CPT

## 2022-12-20 RX ORDER — LIDOCAINE HYDROCHLORIDE 20 MG/ML
SOLUTION OROPHARYNGEAL
Qty: 100 ML | Refills: 0 | Status: SHIPPED
Start: 2022-12-20 | End: 2023-01-10

## 2022-12-20 NOTE — TELEPHONE ENCOUNTER
VOICEMAIL  1. Caller Name: Rancho                      Call Back Number: [facility] pharmacy    2. Message: Rancho from marilyn need clarity on Rx for Lidocaine  solution. It was put in for 1000ml he would like to know if that is what was intended or if it was meant to be 100ml    3. Patient approves office to leave a detailed voicemail/MyChart message: N\A

## 2022-12-21 LAB — TSH SERPL DL<=0.005 MIU/L-ACNC: 4.63 UIU/ML (ref 0.38–5.33)

## 2023-01-10 ENCOUNTER — OFFICE VISIT (OUTPATIENT)
Dept: MEDICAL GROUP | Facility: CLINIC | Age: 23
End: 2023-01-10
Payer: COMMERCIAL

## 2023-01-10 VITALS
SYSTOLIC BLOOD PRESSURE: 118 MMHG | DIASTOLIC BLOOD PRESSURE: 68 MMHG | WEIGHT: 208.4 LBS | TEMPERATURE: 98.3 F | OXYGEN SATURATION: 97 % | HEIGHT: 64 IN | HEART RATE: 76 BPM | RESPIRATION RATE: 20 BRPM | BODY MASS INDEX: 35.58 KG/M2

## 2023-01-10 DIAGNOSIS — J45.20 MILD INTERMITTENT ASTHMA WITHOUT COMPLICATION: ICD-10-CM

## 2023-01-10 DIAGNOSIS — L30.9 ECZEMA, UNSPECIFIED TYPE: ICD-10-CM

## 2023-01-10 DIAGNOSIS — N92.6 IRREGULAR PERIODS: ICD-10-CM

## 2023-01-10 DIAGNOSIS — B07.8 OTHER VIRAL WARTS: ICD-10-CM

## 2023-01-10 DIAGNOSIS — B00.2 HERPETIC GINGIVOSTOMATITIS: ICD-10-CM

## 2023-01-10 PROCEDURE — 99214 OFFICE O/P EST MOD 30 MIN: CPT | Performed by: PHYSICIAN ASSISTANT

## 2023-01-10 RX ORDER — BETAMETHASONE DIPROPIONATE 0.05 %
OINTMENT (GRAM) TOPICAL
Qty: 50 G | Refills: 0 | Status: SHIPPED | OUTPATIENT
Start: 2023-01-10

## 2023-01-10 RX ORDER — ALBUTEROL SULFATE 90 UG/1
1-2 AEROSOL, METERED RESPIRATORY (INHALATION) EVERY 6 HOURS PRN
Qty: 8.5 G | Refills: 4 | Status: SHIPPED | OUTPATIENT
Start: 2023-01-10

## 2023-01-10 ASSESSMENT — FIBROSIS 4 INDEX: FIB4 SCORE: 0.32

## 2023-01-10 ASSESSMENT — PATIENT HEALTH QUESTIONNAIRE - PHQ9: CLINICAL INTERPRETATION OF PHQ2 SCORE: 0

## 2023-01-10 NOTE — PROGRESS NOTES
"cc:  eczema    Subjective:     Eva Yi is a 22 y.o. female presenting for eczema      Patient presents to the office for eczema.  Patient states that her symptoms are worsening.  She did not receive the referral information for dermatology.  She is also having increased warts which are spreading.  She is interested in seeing a dermatologist.     Patient states that she had an irregular period which started early and then lasted 3 weeks.  She does indicate being late on 2-3 of her doses. She does indicate being under increased stress.      Patient needs a refill of her albuterol inhaler.    Review of systems:  See above.   Denies any symptoms unless previously indicated.        Current Outpatient Medications:     betamethasone dipropionate (DIPROLENE) 0.05 % Ointment, Apply a small amount to the affected area twice a day no more than 10 to 14 days., Disp: 50 g, Rfl: 0    albuterol 108 (90 Base) MCG/ACT Aero Soln inhalation aerosol, Inhale 1-2 Puffs every 6 hours as needed for Shortness of Breath., Disp: 8.5 g, Rfl: 4    ibuprofen (MOTRIN) 800 MG Tab, Take 1 Tablet by mouth every 8 hours as needed for Moderate Pain., Disp: 30 Tablet, Rfl: 0    Norgestim-Eth Estrad Triphasic (ORTHO TRI-CYCLEN, 28,) 0.18/0.215/0.25 MG-35 MCG Tab, Take 1 Tablet by mouth every day. Begin medication the first day of your next menstrual period., Disp: 90 Tablet, Rfl: 3    Allergies, past medical history, past surgical history, family history, social history reviewed and updated    Objective:     Vitals: /68 (BP Location: Left arm, Patient Position: Sitting, BP Cuff Size: Large adult)   Pulse 76   Temp 36.8 °C (98.3 °F) (Temporal)   Resp 20   Ht 1.621 m (5' 3.8\")   Wt 94.5 kg (208 lb 6.4 oz)   LMP 01/06/2023 (Exact Date)   SpO2 97%   BMI 36.00 kg/m²   General: Alert, pleasant, NAD  EYES:   PERRL, EOMI, no icterus or pallor.  Conjunctivae and lids normal.   HENT:  Normocephalic.  External ears normal.  Neck supple.  "   Respiratory: Normal respiratory effort.    Abdomen: obese  Skin: Warm, dry, eczema hands and legs.  Patient also has multiple warts on hands.  Musculoskeletal: Gait is normal.  Moves all extremities well.    Extremities: Normal range of motion all extremities.   Neurological: No tremors, sensation grossly intact,  CN2-12 intact.  Psych:  Affect/mood is normal, judgement is good, memory is intact, grooming is appropriate.    Assessment/Plan:     Eva was seen today for follow-up, other and medication refill.    Diagnoses and all orders for this visit:    Eczema, unspecified type  -     Referral to Dermatology  -     betamethasone dipropionate (DIPROLENE) 0.05 % Ointment; Apply a small amount to the affected area twice a day no more than 10 to 14 days.  Other viral warts  -     Referral to Dermatology    Patient advised very small amounts of betamethasone to be used in areas of concern.  Will refer to dermatology for treatment of viral warts.  We will also refer for treatment of eczema.  Patient may need higher level of care due to severity of symptoms.    Herpetic gingivostomatitis    Resolved.  Follow-up as needed.    Irregular periods    This happen 1 time only.  Patient is on OCPs and there was some delay in use of medication on a few days.  She has also had some stressful events and was ill for period of time.  All of this could have played a part in her irregular periods.  We will follow-up in 4 weeks.  We did discuss options including a referral to GYN, switching medication and monitoring.  Patient involved in medical decision making and would like to monitor symptoms.    Mild intermittent asthma without complication  -     albuterol 108 (90 Base) MCG/ACT Aero Soln inhalation aerosol; Inhale 1-2 Puffs every 6 hours as needed for Shortness of Breath.    Medication refill submitted.        Return in about 4 weeks (around 2/7/2023), or if symptoms worsen or fail to improve.    Please note that this dictation  was created using voice recognition software. I have made every reasonable attempt to correct obvious errors, but expect that there are errors of grammar and possible content that I did not discover before finalizing note.

## 2023-02-07 ENCOUNTER — OFFICE VISIT (OUTPATIENT)
Dept: MEDICAL GROUP | Facility: CLINIC | Age: 23
End: 2023-02-07
Payer: COMMERCIAL

## 2023-02-07 VITALS
TEMPERATURE: 98.3 F | WEIGHT: 208 LBS | DIASTOLIC BLOOD PRESSURE: 68 MMHG | BODY MASS INDEX: 35.51 KG/M2 | OXYGEN SATURATION: 93 % | HEART RATE: 87 BPM | RESPIRATION RATE: 20 BRPM | SYSTOLIC BLOOD PRESSURE: 118 MMHG | HEIGHT: 64 IN

## 2023-02-07 DIAGNOSIS — L30.9 ECZEMA, UNSPECIFIED TYPE: ICD-10-CM

## 2023-02-07 DIAGNOSIS — Z30.09 BIRTH CONTROL COUNSELING: ICD-10-CM

## 2023-02-07 DIAGNOSIS — N92.6 IRREGULAR PERIODS: ICD-10-CM

## 2023-02-07 PROCEDURE — 99213 OFFICE O/P EST LOW 20 MIN: CPT | Performed by: PHYSICIAN ASSISTANT

## 2023-02-07 RX ORDER — NORGESTIMATE AND ETHINYL ESTRADIOL 7DAYSX3 28
KIT ORAL
Qty: 84 TABLET | Refills: 2
Start: 2023-02-07 | End: 2023-08-01 | Stop reason: SDUPTHER

## 2023-02-07 ASSESSMENT — FIBROSIS 4 INDEX: FIB4 SCORE: 0.32

## 2023-02-07 NOTE — PROGRESS NOTES
"cc:  irregular period    Subjective:     Eva Yi is a 22 y.o. female presenting for irregular period      Patient presents to the office for irregular period.  She states that since December she has had only one period.  She states that her last period started the end of January, beginning of February. She has been on her current birth control for 2-3 years. She has had some increased pain with the last period. She states that with this last period, it was more regular for her.      Patient states that she is still having eczema symptoms.  She has been referred to dermatology but has not made an appointment.  She states that the betamethasone did help some. She states that she will make an appointment with dermatology.    Review of systems:  See above.   Denies any symptoms unless previously indicated.        Current Outpatient Medications:     Norgestim-Eth Estrad Triphasic (TRI-SPRINTEC) 0.18/0.215/0.25 MG-35 MCG Tab, TAKE ONE TABLET BY MOUTH DAILY BEGIN MEDICATION THE FIRST DAY OF YOUR NEXT MENSTRUL PERIOD, Disp: 84 Tablet, Rfl: 2    betamethasone dipropionate (DIPROLENE) 0.05 % Ointment, Apply a small amount to the affected area twice a day no more than 10 to 14 days., Disp: 50 g, Rfl: 0    albuterol 108 (90 Base) MCG/ACT Aero Soln inhalation aerosol, Inhale 1-2 Puffs every 6 hours as needed for Shortness of Breath., Disp: 8.5 g, Rfl: 4    ibuprofen (MOTRIN) 800 MG Tab, Take 1 Tablet by mouth every 8 hours as needed for Moderate Pain., Disp: 30 Tablet, Rfl: 0    Allergies, past medical history, past surgical history, family history, social history reviewed and updated    Objective:     Vitals: /68 (BP Location: Left arm, Patient Position: Sitting, BP Cuff Size: Adult)   Pulse 87   Temp 36.8 °C (98.3 °F) (Temporal)   Resp 20   Ht 1.626 m (5' 4\")   Wt 94.3 kg (208 lb)   LMP 01/28/2023 (Exact Date) Comment: ended 2/4/23  SpO2 93%   BMI 35.70 kg/m²   General: Alert, pleasant, NAD  EYES:   PERRL, " EOMI, no icterus or pallor.  Conjunctivae and lids normal.   HENT:  Normocephalic.  External ears normal.   Neck supple.    Respiratory: Normal respiratory effort.    Abdomen: obese  Skin: Warm, dry, no rashes.  Musculoskeletal: Gait is normal.  Moves all extremities well.    Extremities: normal range of motion all extremities.   Neurological: No tremors, sensation grossly intact,  CN2-12 intact.  Psych:  Affect/mood is normal, judgement is good, memory is intact, grooming is appropriate.    Assessment/Plan:     Eva was seen today for menstrual problem and follow-up.    Diagnoses and all orders for this visit:    Eczema, unspecified type    Stable.  Patient will schedule an appointment with dermatology for further evaluation.  Patient will continue with betamethasone in the meantime.  If unable to be seen in the next several months, may need to alternate steroid creams.    Irregular periods    Patient has missed a cycle due to not taking medication when she was ill at the end of 2022.  Believes cycle is starting to stabilize.  Discussed treating with a different medication, pelvic ultrasound or monitoring with current dose and she would like to stay with her current medication.  She [facility] us [facility] if there are any symptoms that persist.        Return if symptoms worsen or fail to improve.    Please note that this dictation was created using voice recognition software. I have made every reasonable attempt to correct obvious errors, but expect that there are errors of grammar and possible content that I did not discover before finalizing note.

## 2023-06-29 ENCOUNTER — HOSPITAL ENCOUNTER (EMERGENCY)
Facility: MEDICAL CENTER | Age: 23
End: 2023-06-29
Attending: EMERGENCY MEDICINE
Payer: COMMERCIAL

## 2023-06-29 ENCOUNTER — OFFICE VISIT (OUTPATIENT)
Dept: URGENT CARE | Facility: PHYSICIAN GROUP | Age: 23
End: 2023-06-29
Payer: COMMERCIAL

## 2023-06-29 VITALS
HEART RATE: 91 BPM | OXYGEN SATURATION: 95 % | WEIGHT: 214 LBS | SYSTOLIC BLOOD PRESSURE: 122 MMHG | DIASTOLIC BLOOD PRESSURE: 72 MMHG | RESPIRATION RATE: 16 BRPM | HEIGHT: 63 IN | BODY MASS INDEX: 37.92 KG/M2 | TEMPERATURE: 97.5 F

## 2023-06-29 VITALS
DIASTOLIC BLOOD PRESSURE: 71 MMHG | OXYGEN SATURATION: 99 % | SYSTOLIC BLOOD PRESSURE: 132 MMHG | RESPIRATION RATE: 18 BRPM | HEIGHT: 63 IN | BODY MASS INDEX: 38.24 KG/M2 | WEIGHT: 215.83 LBS | TEMPERATURE: 98.9 F | HEART RATE: 78 BPM

## 2023-06-29 DIAGNOSIS — R42 DIZZINESS: ICD-10-CM

## 2023-06-29 DIAGNOSIS — R11.0 NAUSEA: ICD-10-CM

## 2023-06-29 DIAGNOSIS — E86.0 DEHYDRATION: ICD-10-CM

## 2023-06-29 LAB
ALBUMIN SERPL BCP-MCNC: 4.2 G/DL (ref 3.2–4.9)
ALBUMIN/GLOB SERPL: 1.3 G/DL
ALP SERPL-CCNC: 101 U/L (ref 30–99)
ALT SERPL-CCNC: 23 U/L (ref 2–50)
ANION GAP SERPL CALC-SCNC: 13 MMOL/L (ref 7–16)
AST SERPL-CCNC: 16 U/L (ref 12–45)
BASOPHILS # BLD AUTO: 0.5 % (ref 0–1.8)
BASOPHILS # BLD: 0.06 K/UL (ref 0–0.12)
BILIRUB SERPL-MCNC: 0.2 MG/DL (ref 0.1–1.5)
BUN SERPL-MCNC: 18 MG/DL (ref 8–22)
CALCIUM ALBUM COR SERPL-MCNC: 9.2 MG/DL (ref 8.5–10.5)
CALCIUM SERPL-MCNC: 9.4 MG/DL (ref 8.4–10.2)
CHLORIDE SERPL-SCNC: 105 MMOL/L (ref 96–112)
CO2 SERPL-SCNC: 21 MMOL/L (ref 20–33)
CREAT SERPL-MCNC: 0.49 MG/DL (ref 0.5–1.4)
EKG IMPRESSION: NORMAL
EOSINOPHIL # BLD AUTO: 0.28 K/UL (ref 0–0.51)
EOSINOPHIL NFR BLD: 2.3 % (ref 0–6.9)
ERYTHROCYTE [DISTWIDTH] IN BLOOD BY AUTOMATED COUNT: 38.1 FL (ref 35.9–50)
GFR SERPLBLD CREATININE-BSD FMLA CKD-EPI: 137 ML/MIN/1.73 M 2
GLOBULIN SER CALC-MCNC: 3.3 G/DL (ref 1.9–3.5)
GLUCOSE SERPL-MCNC: 90 MG/DL (ref 65–99)
HCT VFR BLD AUTO: 41 % (ref 37–47)
HGB BLD-MCNC: 13.7 G/DL (ref 12–16)
IMM GRANULOCYTES # BLD AUTO: 0.05 K/UL (ref 0–0.11)
IMM GRANULOCYTES NFR BLD AUTO: 0.4 % (ref 0–0.9)
LYMPHOCYTES # BLD AUTO: 3.65 K/UL (ref 1–4.8)
LYMPHOCYTES NFR BLD: 29.6 % (ref 22–41)
MCH RBC QN AUTO: 28.9 PG (ref 27–33)
MCHC RBC AUTO-ENTMCNC: 33.4 G/DL (ref 32.2–35.5)
MCV RBC AUTO: 86.5 FL (ref 81.4–97.8)
MONOCYTES # BLD AUTO: 0.77 K/UL (ref 0–0.85)
MONOCYTES NFR BLD AUTO: 6.2 % (ref 0–13.4)
NEUTROPHILS # BLD AUTO: 7.54 K/UL (ref 1.82–7.42)
NEUTROPHILS NFR BLD: 61 % (ref 44–72)
NRBC # BLD AUTO: 0 K/UL
NRBC BLD-RTO: 0 /100 WBC (ref 0–0.2)
PLATELET # BLD AUTO: 334 K/UL (ref 164–446)
PMV BLD AUTO: 9.8 FL (ref 9–12.9)
POCT INT CON NEG: NEGATIVE
POCT INT CON POS: POSITIVE
POCT URINE PREGNANCY TEST: NEGATIVE
POTASSIUM SERPL-SCNC: 4 MMOL/L (ref 3.6–5.5)
PROT SERPL-MCNC: 7.5 G/DL (ref 6–8.2)
RBC # BLD AUTO: 4.74 M/UL (ref 4.2–5.4)
SODIUM SERPL-SCNC: 139 MMOL/L (ref 135–145)
WBC # BLD AUTO: 12.4 K/UL (ref 4.8–10.8)

## 2023-06-29 PROCEDURE — 93005 ELECTROCARDIOGRAM TRACING: CPT | Performed by: EMERGENCY MEDICINE

## 2023-06-29 PROCEDURE — 85025 COMPLETE CBC W/AUTO DIFF WBC: CPT

## 2023-06-29 PROCEDURE — 93005 ELECTROCARDIOGRAM TRACING: CPT

## 2023-06-29 PROCEDURE — 700105 HCHG RX REV CODE 258: Performed by: EMERGENCY MEDICINE

## 2023-06-29 PROCEDURE — 99214 OFFICE O/P EST MOD 30 MIN: CPT | Performed by: NURSE PRACTITIONER

## 2023-06-29 PROCEDURE — 36415 COLL VENOUS BLD VENIPUNCTURE: CPT

## 2023-06-29 PROCEDURE — 81025 URINE PREGNANCY TEST: CPT | Performed by: NURSE PRACTITIONER

## 2023-06-29 PROCEDURE — 3078F DIAST BP <80 MM HG: CPT | Performed by: NURSE PRACTITIONER

## 2023-06-29 PROCEDURE — 96374 THER/PROPH/DIAG INJ IV PUSH: CPT

## 2023-06-29 PROCEDURE — 93000 ELECTROCARDIOGRAM COMPLETE: CPT | Performed by: NURSE PRACTITIONER

## 2023-06-29 PROCEDURE — 80053 COMPREHEN METABOLIC PANEL: CPT

## 2023-06-29 PROCEDURE — 700111 HCHG RX REV CODE 636 W/ 250 OVERRIDE (IP): Mod: JZ | Performed by: EMERGENCY MEDICINE

## 2023-06-29 PROCEDURE — 3074F SYST BP LT 130 MM HG: CPT | Performed by: NURSE PRACTITIONER

## 2023-06-29 PROCEDURE — 99284 EMERGENCY DEPT VISIT MOD MDM: CPT

## 2023-06-29 RX ORDER — ONDANSETRON 2 MG/ML
4 INJECTION INTRAMUSCULAR; INTRAVENOUS ONCE
Status: COMPLETED | OUTPATIENT
Start: 2023-06-29 | End: 2023-06-29

## 2023-06-29 RX ORDER — ONDANSETRON 4 MG/1
4 TABLET, ORALLY DISINTEGRATING ORAL EVERY 6 HOURS PRN
Qty: 10 TABLET | Refills: 0 | Status: SHIPPED | OUTPATIENT
Start: 2023-06-29 | End: 2023-09-11

## 2023-06-29 RX ORDER — SODIUM CHLORIDE 9 MG/ML
1000 INJECTION, SOLUTION INTRAVENOUS ONCE
Status: COMPLETED | OUTPATIENT
Start: 2023-06-29 | End: 2023-06-29

## 2023-06-29 RX ADMIN — SODIUM CHLORIDE 1000 ML: 9 INJECTION, SOLUTION INTRAVENOUS at 18:13

## 2023-06-29 RX ADMIN — ONDANSETRON 4 MG: 2 INJECTION INTRAMUSCULAR; INTRAVENOUS at 18:20

## 2023-06-29 ASSESSMENT — FIBROSIS 4 INDEX
FIB4 SCORE: 0.32
FIB4 SCORE: 0.32

## 2023-06-29 NOTE — LETTER
June 29, 2023         Patient: Eva Yi   YOB: 2000   Date of Visit: 6/29/2023           To Whom it May Concern:    Eva Yi was seen in my clinic on 6/29/2023. She may be excused from work tonight.    If you have any questions or concerns, please don't hesitate to call.        Sincerely,           SARAH Maciel  Electronically Signed

## 2023-06-29 NOTE — ED TRIAGE NOTES
"Chief Complaint   Patient presents with    Dizziness    Nausea    Lightheadedness     21 yo female ambulates to triage with reports of lightheaded, nausea, and dizziness since yesterday.  Reports she felt hot and got a headache and became nauseated.  Seen at  today told to come for further eval.  Denies chest pain or SOB.      /79   Pulse 87   Temp 36.7 °C (98.1 °F) (Temporal)   Resp 18   Ht 1.6 m (5' 3\")   Wt 97.9 kg (215 lb 13.3 oz)   LMP 06/21/2023 (Approximate)   SpO2 96%   BMI 38.23 kg/m²    "

## 2023-06-29 NOTE — PROGRESS NOTES
Chief Complaint   Patient presents with    Dizziness     X1 day Dizziness and light headed, nausea, got really hot, sugar levels were good, BP was 126/100, body felt heavy        HISTORY OF PRESENT ILLNESS: Patient is a pleasant 22 y.o. female who presents to urgent care today with concerns of dizziness.  The patient notes that she had several episodes of lightheadedness and dizziness yesterday.  This occurred while she was riding transportation, moving from sitting to standing position, and in a hot room.  Episodes were short-lived and improved spontaneously.  She does report some mild posterior headache since the incident.  She was evaluated at work after these episodes, blood sugar was 118 and blood pressure was 126/100.  She does report associated nausea and palpitations.  Denies significant cardiac history, does admit to history of heavy periods.    Patient Active Problem List    Diagnosis Date Noted    Other viral warts 01/10/2023    Herpetic gingivostomatitis 12/19/2022    Other chest pain 12/19/2022    Irregular periods 12/19/2022    Other constipation 12/19/2022    Birth control counseling 05/03/2021    Newly recognized heart murmur 11/07/2018    Overweight, pediatric, BMI (body mass index) 95-99% for age 09/12/2017    Mild intermittent asthma without complication 09/12/2017    Eczema 06/30/2016    Attention deficit hyperactivity disorder 06/30/2016    Seasonal allergies 04/26/2013       Allergies:Pcn [penicillins]    Current Outpatient Medications Ordered in Epic   Medication Sig Dispense Refill    Norgestim-Eth Estrad Triphasic (TRI-SPRINTEC) 0.18/0.215/0.25 MG-35 MCG Tab TAKE ONE TABLET BY MOUTH DAILY BEGIN MEDICATION THE FIRST DAY OF YOUR NEXT MENSTRUL PERIOD 84 Tablet 2    betamethasone dipropionate (DIPROLENE) 0.05 % Ointment Apply a small amount to the affected area twice a day no more than 10 to 14 days. 50 g 0    albuterol 108 (90 Base) MCG/ACT Aero Soln inhalation aerosol Inhale 1-2 Puffs every 6  "hours as needed for Shortness of Breath. 8.5 g 4    ibuprofen (MOTRIN) 800 MG Tab Take 1 Tablet by mouth every 8 hours as needed for Moderate Pain. 30 Tablet 0     No current Epic-ordered facility-administered medications on file.       Past Medical History:   Diagnosis Date    ADHD (attention deficit hyperactivity disorder)     Allergy     ASTHMA        Social History     Tobacco Use    Smoking status: Never    Smokeless tobacco: Never   Vaping Use    Vaping Use: Never used   Substance Use Topics    Alcohol use: No     Alcohol/week: 0.0 oz    Drug use: No       Family Status   Relation Name Status    Mo  Alive    Fa  Alive    Sis  Alive    Bro  Alive    PGMo  Alive    PGFa      Sis  Alive     Family History   Problem Relation Age of Onset    Alcohol/Drug Mother     Psychiatric Illness Mother     Other Mother     No Known Problems Father     Hypertension Paternal Grandmother        ROS:  Review of Systems   Constitutional: Negative for fever, chills, weight loss, malaise, and fatigue.   HENT: Negative for ear pain, nosebleeds, congestion, sore throat and neck pain.    Eyes: Negative for vision changes.   Neuro: Positive for headache, lightheadedness, dizziness.  Negative for sensory changes, weakness, seizure, LOC.   Cardiovascular: Positive for palpitations.  Negative for chest pain, orthopnea and leg swelling.   Respiratory: Negative for cough, sputum production, shortness of breath and wheezing.   Gastrointestinal: Positive for nausea.  Negative for abdominal pain, vomiting or diarrhea.   Genitourinary: Negative for dysuria, urgency and frequency.  Musculoskeletal: Negative for falls, neck pain, back pain, joint pain, myalgias.   Skin: Negative for rash, diaphoresis.     Exam:  /72   Pulse 91   Temp 36.4 °C (97.5 °F) (Temporal)   Resp 16   Ht 1.6 m (5' 3\")   Wt 97.1 kg (214 lb)   SpO2 95%   General: well-nourished, well-developed female in NAD  Head: normocephalic, atraumatic  Eyes: PERRLA, no " conjunctival injection, acuity grossly intact, lids normal.  Ears: normal shape and symmetry, no tenderness, no discharge. External canals are without any significant edema or erythema. Tympanic membranes are without any inflammation, no effusion. Gross auditory acuity is intact.  Nose: symmetrical without tenderness, no discharge.  Mouth/Throat: reasonable hygiene, no erythema, exudates or tonsillar enlargement.  Neck: no masses, range of motion within normal limits, no tracheal deviation. No obvious thyroid enlargement.   Lymph: no cervical adenopathy. No supraclavicular adenopathy.   Neuro: alert and oriented. Cranial nerves 1-12 grossly intact. No sensory deficit.   Cardiovascular: regular rate and rhythm. No edema.  Pulmonary: no distress. Chest is symmetrical with respiration, no wheezes, crackles, or rhonchi.   Abdomen: soft, non-tender, no guarding, no hepatosplenomegaly.  Musculoskeletal: no clubbing, appropriate muscle tone, gait is stable.  Skin: Pale, warm, dry, intact, no clubbing, no cyanosis, no rashes.   Psych: appropriate mood, affect, judgement.       12-lead study EKG interpretation, interpreted by myself.  The rhythm is sinus with a rate of 71.  There is no ectopy. There are no acute ST segment changes and no T wave abnormalities.  Interpretation: No ST segment elevation myocardial infarction.  No significant changes from previous EKG from 12/22.      POC preg negative      Assessment/Plan:  1. Dizziness  POCT Pregnancy    EKG            Patient is a 22-year-old female who presents with concerns of lightheadedness, dizziness, palpitations, nausea.  EKG without any significant findings, pregnancy negative.  Nevertheless, at this time, I feel the patient requires a higher level of care in the ED for closer monitoring, stat lab work and/or imaging for further evaluation. This has been discussed with the patient and she states agreement and understanding.  The patient is stable to leave POV at this  time and will go directly to ED without delay, I offered the patient an ambulance ride, she has platelet declined and states that she will not be driving.        Please note that this dictation was created using voice recognition software. I have made every reasonable attempt to correct obvious errors, but I expect that there are errors of grammar and possibly content that I did not discover before finalizing the note.      JS Maciel.

## 2023-06-30 NOTE — DISCHARGE INSTRUCTIONS
Make sure that you stay well-hydrated especially as summer starts to progress.  You need to drink at least 1/2 to 1 gallon of water per day.  Especially because you work with a lot of equipment on you can get very dehydrated.  Make sure that you take some electrolyte fluids as well.  All of your blood work was completely normal.  Follow-up with your primary care provider within the next 2 to 3 days if symptoms persist and return if any change or worsening in your condition.

## 2023-06-30 NOTE — ED PROVIDER NOTES
"ER Provider Note    Scribed for Dr. Brenda Seth D.O. by She Baum. 6/29/2023  5:27 PM    Primary Care Provider: Brenda Ashford P.A.-C.    CHIEF COMPLAINT  Chief Complaint   Patient presents with    Dizziness    Nausea    Lightheadedness     21 yo female ambulates to triage with reports of lightheaded, nausea, and dizziness since yesterday.  Reports she felt hot and got a headache and became nauseated.  Seen at  today told to come for further eval.  Denies chest pain or SOB.         EXTERNAL RECORDS REVIEWED  Outpatient Notes The patient was seen at [facility] on 02/07/2023 for an irregular menstrual cycle. The patient was prescribed Tri-Sprintec and was recommended to stay on this medication.     HPI/ROS  LIMITATION TO HISTORY   Select: : None    Eva Yi is a 22 y.o. female who presents to the ED for evaluation of nausea onset 1 day ago. She describes that when she was on her way to work yesterday, she began to feel nauseous. She thought it was due to hot air being blown on her feet and cold air being blown on her face. She then notes that when she was having a meeting at work, she again began to feel nauseous and develop a headache. 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 She states \"I could not control my body temperature.\"  She reports that at this time, she felt extremely lightheaded and dizzy. The patient states that the dizziness felt like she stood up too quickly. The patient denies any chest pain, shortness of breath, vomiting or diarrhea. The patient went to Urgent Care today to get evaluated and a pregnancy test was performed. The test came back negative and the patient was recommended to come to the emergency department for further evaluation. She reports that her last menstrual period was 1 week ago. The patient is allergic to penicillin. She has history of a heart murmur. She reports family " "history of diabetes.    PAST MEDICAL HISTORY  Past Medical History:   Diagnosis Date    ADHD (attention deficit hyperactivity disorder)     Allergy     ASTHMA     Heart murmur      SURGICAL HISTORY  History reviewed. No pertinent surgical history.    FAMILY HISTORY  Family History   Problem Relation Age of Onset    Alcohol/Drug Mother     Psychiatric Illness Mother     Other Mother     No Known Problems Father     Hypertension Paternal Grandmother      SOCIAL HISTORY   reports that she has never smoked. She has never used smokeless tobacco. She reports that she does not drink alcohol and does not use drugs.    CURRENT MEDICATIONS  Discharge Medication List as of 6/29/2023  8:00 PM        CONTINUE these medications which have NOT CHANGED    Details   Norgestim-Eth Estrad Triphasic (TRI-SPRINTEC) 0.18/0.215/0.25 MG-35 MCG Tab TAKE ONE TABLET BY MOUTH DAILY BEGIN MEDICATION THE FIRST DAY OF YOUR NEXT MENSTRUL PERIOD, Disp-84 Tablet, R-2, No Print      betamethasone dipropionate (DIPROLENE) 0.05 % Ointment Apply a small amount to the affected area twice a day no more than 10 to 14 days., Disp-50 g, R-0, Normal      albuterol 108 (90 Base) MCG/ACT Aero Soln inhalation aerosol Inhale 1-2 Puffs every 6 hours as needed for Shortness of Breath., Disp-8.5 g, R-4, Normal      ibuprofen (MOTRIN) 800 MG Tab Take 1 Tablet by mouth every 8 hours as needed for Moderate Pain., Disp-30 Tablet, R-0, Normal           ALLERGIES  Pcn [penicillins]    PHYSICAL EXAM  /79   Pulse 87   Temp 36.7 °C (98.1 °F) (Temporal)   Resp 18   Ht 1.6 m (5' 3\")   Wt 97.9 kg (215 lb 13.3 oz)   LMP 06/21/2023 (Approximate)   SpO2 96%   BMI 38.23 kg/m²   Constitutional: Patient is well developed, well nourished. Non-toxic appearing. No acute distress.   HENT: Normocephalic, atraumatic. Moist oral mucosa.    Cardiovascular: Normal heart rate and Regular rhythm. No murmur.  Thorax & Lungs: Clear and equal breath sounds with good excursion. No " respiratory distress, no rhonchi, wheezing or rales.  Abdomen: Bowel sounds normal in all four quadrants. Soft,nontender, Slightly obese, no rebound , guarding, palpable masses.   Skin: Warm, Dry, No erythema, No rashes.    Extremities: Peripheral pulses 4/4 No edema, No tenderness    Neurologic: Alert & oriented x 3, Normal motor function, Normal sensory function.  Psychiatric: Affect normal, Judgment normal, Mood normal.     DIAGNOSTIC STUDIES & PROCEDURES    Labs:   Results for orders placed or performed during the hospital encounter of 06/29/23   CBC WITH DIFFERENTIAL   Result Value Ref Range    WBC 12.4 (H) 4.8 - 10.8 K/uL    RBC 4.74 4.20 - 5.40 M/uL    Hemoglobin 13.7 12.0 - 16.0 g/dL    Hematocrit 41.0 37.0 - 47.0 %    MCV 86.5 81.4 - 97.8 fL    MCH 28.9 27.0 - 33.0 pg    MCHC 33.4 32.2 - 35.5 g/dL    RDW 38.1 35.9 - 50.0 fL    Platelet Count 334 164 - 446 K/uL    MPV 9.8 9.0 - 12.9 fL    Neutrophils-Polys 61.00 44.00 - 72.00 %    Lymphocytes 29.60 22.00 - 41.00 %    Monocytes 6.20 0.00 - 13.40 %    Eosinophils 2.30 0.00 - 6.90 %    Basophils 0.50 0.00 - 1.80 %    Immature Granulocytes 0.40 0.00 - 0.90 %    Nucleated RBC 0.00 0.00 - 0.20 /100 WBC    Neutrophils (Absolute) 7.54 (H) 1.82 - 7.42 K/uL    Lymphs (Absolute) 3.65 1.00 - 4.80 K/uL    Monos (Absolute) 0.77 0.00 - 0.85 K/uL    Eos (Absolute) 0.28 0.00 - 0.51 K/uL    Baso (Absolute) 0.06 0.00 - 0.12 K/uL    Immature Granulocytes (abs) 0.05 0.00 - 0.11 K/uL    NRBC (Absolute) 0.00 K/uL   COMP METABOLIC PANEL   Result Value Ref Range    Sodium 139 135 - 145 mmol/L    Potassium 4.0 3.6 - 5.5 mmol/L    Chloride 105 96 - 112 mmol/L    Co2 21 20 - 33 mmol/L    Anion Gap 13.0 7.0 - 16.0    Glucose 90 65 - 99 mg/dL    Bun 18 8 - 22 mg/dL    Creatinine 0.49 (L) 0.50 - 1.40 mg/dL    Calcium 9.4 8.4 - 10.2 mg/dL    AST(SGOT) 16 12 - 45 U/L    ALT(SGPT) 23 2 - 50 U/L    Alkaline Phosphatase 101 (H) 30 - 99 U/L    Total Bilirubin 0.2 0.1 - 1.5 mg/dL    Albumin 4.2  3.2 - 4.9 g/dL    Total Protein 7.5 6.0 - 8.2 g/dL    Globulin 3.3 1.9 - 3.5 g/dL    A-G Ratio 1.3 g/dL   CORRECTED CALCIUM   Result Value Ref Range    Correct Calcium 9.2 8.5 - 10.5 mg/dL   ESTIMATED GFR   Result Value Ref Range    GFR (CKD-EPI) 137 >60 mL/min/1.73 m 2   EKG   Result Value Ref Range    Report       [facility] Emergency Dept.    Test Date:  2023  Pt Name:    KAREN OLIVARES                 Department: [facility]  MRN:        [id number]                      Room:  Gender:     Female                       Technician: JOSE D  :        2000                   Requested By:ER TRIAGE PROTOCOL  Order #:    [id number]                    Reading MD:    Measurements  Intervals                                Axis  Rate:       78                           P:          37  HI:         191                          QRS:        63  QRSD:       82                           T:          27  QT:         361  QTc:        412    Interpretive Statements  Sinus rhythm  No previous ECG available for comparison        All labs reviewed by me.    EKG:   I have independently interpreted this EKG    COURSE & MEDICAL DECISION MAKING    ED Observation Status? Yes; I am placing the patient in to an observation status due to a diagnostic uncertainty as well as therapeutic intensity. Patient placed in observation status at 5:35 PM, 2023.     Observation plan is as follows: Will perform an EKG and lab work for further evaluation of her symptoms.     Upon Reevaluation, the patient's condition has: Improved; and will be discharged.    Patient discharged from ED Observation status at 8:04 PM (Time) 2023 (Date).     INITIAL ASSESSMENT AND PLAN  Care Narrative:       5:27 PM - Patient seen and evaluated at bedside. The patient is a 22 year old female who presents to the ED for evaluation of nausea onset 1 day ago. Discussed plan of care, including performing an EKG and lab work. Patient agrees to plan of  care. Patient will be treated with fluids for potential dehydration and Zofran 4 mg for her nausea. Ordered an EKG, CMP and CBC w/ Diff. to evaluate. Differential diagnoses include but are not limited to: Dehydration. Viral illness.   Patient's laboratories were all completely unremarkable.  She responded well to the IV fluid challenge and states that she would like to try some p.o. fluids.  6:15 PM - Patient was reevaluated at bedside. The patient was able to tolerate juice well and states that she is feeling improved.     7:50 PM - Patient was reevaluated at bedside. Discussed lab results with the patient and informed them that they are reassuring. The patient states that she continues to feel improved. I informed the patient that I will prescribe her Zofran for at home treatment if her nausea continues. Discussed discharge instructions and return precautions with the patient and they were cleared for discharge. Patient was given the opportunity to ask any further questions. She is comfortable with discharge at this time.         HYDRATION: Based on the patient's presentation of Dehydration the patient was given IV fluids. IV Hydration was used because oral hydration was not adequate alone. Upon recheck following hydration, the patient was improved.                   DISPOSITION AND DISCUSSIONS  I have discussed management of the patient with the following physicians and AMANUEL's: None.    Discussion of management with other [facility] or appropriate source(s): None     Barriers to care at this time, including but not limited to:  None known .     Decision tools and prescription drugs considered including, but not limited to:  Zofran ODT.  Patient is instructed to drink lots of fluids especially while at work and during the summer months.  She is to rest and return if problems. .    The patient will return for new or worsening symptoms and is stable at the time of discharge.    DISPOSITION:  Patient will be discharged home  in stable condition.    FOLLOW UP:  Brenda Ashford P.A.-C.  [address] [id number] [phone]    Schedule an appointment as soon as possible for a visit in 3 days  As needed, If symptoms worsen    OUTPATIENT MEDICATIONS:  Discharge Medication List as of 6/29/2023  8:00 PM        START taking these medications    Details   ondansetron (ZOFRAN ODT) 4 MG TABLET DISPERSIBLE Take 1 Tablet by mouth every 6 hours as needed for Nausea/Vomiting., Disp-10 Tablet, R-0, Normal            FINAL IMPRESSION   1. Dehydration    2. Dizziness    3. Nausea       IShe (Scribe), am scribing for, and in the presence of, Brenda Seth D.O..    Electronically signed by: She Baum (Scribe), 6/29/2023    I, Brenda Seth D.O. personally performed the services described in this documentation, as scribed by She Baum in my presence, and it is both accurate and complete.    The note accurately reflects work and decisions made by me.  Brenda Seth D.O.  6/30/2023  12:02 AM

## 2023-08-01 ENCOUNTER — OFFICE VISIT (OUTPATIENT)
Dept: MEDICAL GROUP | Facility: CLINIC | Age: 23
End: 2023-08-01
Payer: COMMERCIAL

## 2023-08-01 VITALS
OXYGEN SATURATION: 96 % | HEIGHT: 63 IN | TEMPERATURE: 97.9 F | DIASTOLIC BLOOD PRESSURE: 82 MMHG | RESPIRATION RATE: 16 BRPM | HEART RATE: 95 BPM | SYSTOLIC BLOOD PRESSURE: 128 MMHG | BODY MASS INDEX: 38.13 KG/M2 | WEIGHT: 215.2 LBS

## 2023-08-01 DIAGNOSIS — M25.512 CHRONIC PAIN OF BOTH SHOULDERS: ICD-10-CM

## 2023-08-01 DIAGNOSIS — G89.29 CHRONIC PAIN OF BOTH SHOULDERS: ICD-10-CM

## 2023-08-01 DIAGNOSIS — Z30.09 BIRTH CONTROL COUNSELING: ICD-10-CM

## 2023-08-01 DIAGNOSIS — R19.4 FREQUENT BOWEL MOVEMENTS: ICD-10-CM

## 2023-08-01 DIAGNOSIS — M25.511 CHRONIC PAIN OF BOTH SHOULDERS: ICD-10-CM

## 2023-08-01 PROCEDURE — 3074F SYST BP LT 130 MM HG: CPT | Performed by: PHYSICIAN ASSISTANT

## 2023-08-01 PROCEDURE — 99214 OFFICE O/P EST MOD 30 MIN: CPT | Performed by: PHYSICIAN ASSISTANT

## 2023-08-01 PROCEDURE — 3079F DIAST BP 80-89 MM HG: CPT | Performed by: PHYSICIAN ASSISTANT

## 2023-08-01 RX ORDER — DICYCLOMINE HYDROCHLORIDE 10 MG/1
10 CAPSULE ORAL
Qty: 180 CAPSULE | Refills: 0 | Status: SHIPPED | OUTPATIENT
Start: 2023-08-01 | End: 2023-09-11 | Stop reason: SDUPTHER

## 2023-08-01 RX ORDER — NORGESTIMATE AND ETHINYL ESTRADIOL 7DAYSX3 28
KIT ORAL
Qty: 84 TABLET | Refills: 2 | Status: SHIPPED | OUTPATIENT
Start: 2023-08-01 | End: 2024-02-05

## 2023-08-01 ASSESSMENT — FIBROSIS 4 INDEX: FIB4 SCORE: 0.21

## 2023-08-01 NOTE — LETTER
August 1, 2023       Patient: Eva Yi   YOB: 2000   Date of Visit: 8/1/2023         To Whom It May Concern:    In my medical opinion, Eva Yi is undergoing further medical evaluation for frequent restroom breaks.    If you have any questions or concerns, please don't hesitate to call [phone]          Sincerely,          Brenda Ashford P.A.-C.  Electronically Signed

## 2023-08-01 NOTE — PROGRESS NOTES
cc:  shoulder pain    Subjective:     Eva Yi is a 23 y.o. female presenting for shoulder pain      Patient presents to the office for shoulder pain.  She states that this was originally related to work but is not a workman's comp issue.  She has been having bilateral shoulder pain with the left shoulder bothersome for a year and the right for 6 months. She fell down stairs at work in 2021 and this is how she injured her left shoulder. She states that the right shoulder just started to hurt.  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  She states that dropping powder is what can cause the shoulder pain.     Patient states that she is having stomach pain and states that she had to have a discussion with HR as she is using the restroom frequently.  She states that she had an episode with her period where she was in the restroom for 45 to 60 minutes.  She was advised to follow up.    She states that she is having frequent bowel movements and can go up to 3-4 times a day.  She has been under increased stress.  She will have frequent cramping    Review of systems:  See above.   Denies any symptoms unless previously indicated.        Current Outpatient Medications:     dicyclomine (BENTYL) 10 MG Cap, Take 1 Capsule by mouth 2 (two) times a day., Disp: 180 Capsule, Rfl: 0    Norgestim-Eth Estrad Triphasic (TRI-SPRINTEC) 0.18/0.215/0.25 MG-35 MCG Tab, TAKE ONE TABLET BY MOUTH DAILY BEGIN MEDICATION THE FIRST DAY OF YOUR NEXT MENSTRUL PERIOD, Disp: 84 Tablet, Rfl: 2    betamethasone dipropionate (DIPROLENE) 0.05 % Ointment, Apply a small amount to the affected area twice a day no more than 10 to 14 days., Disp: 50 g, Rfl: 0    albuterol 108 (90 Base) MCG/ACT Aero Soln inhalation aerosol, Inhale 1-2 Puffs every 6 hours as needed for Shortness of Breath., Disp: 8.5 g, Rfl: 4    ondansetron (ZOFRAN ODT) 4 MG TABLET DISPERSIBLE, Take 1 Tablet by mouth every 6 hours as needed for Nausea/Vomiting., Disp: 10 Tablet,  "Rfl: 0    ibuprofen (MOTRIN) 800 MG Tab, Take 1 Tablet by mouth every 8 hours as needed for Moderate Pain. (Patient not taking: Reported on 8/1/2023), Disp: 30 Tablet, Rfl: 0    Allergies, past medical history, past surgical history, family history, social history reviewed and updated    Objective:     Vitals: /82 (BP Location: Left arm, Patient Position: Sitting, BP Cuff Size: Large adult)   Pulse 95   Temp 36.6 °C (97.9 °F) (Temporal)   Resp 16   Ht 1.6 m (5' 3\")   Wt 97.6 kg (215 lb 3.2 oz)   LMP 07/13/2023 (Exact Date)   SpO2 96%   BMI 38.12 kg/m²   General: Alert, pleasant, NAD  EYES:   PERRL, EOMI, no icterus or pallor.  Conjunctivae and lids normal.   HENT:  Normocephalic.  External ears normal.   Neck supple.   Respiratory: Normal respiratory effort.    Abdomen: obese  Skin: Warm, dry, no rashes.  Musculoskeletal: Gait is normal.  Moves all extremities well.    Extremities: normal range of motion all extremities.   Neurological: No tremors, sensation grossly intact, CN2-12 intact.  Psych:  Affect/mood is normal, judgement is good, memory is intact, grooming is appropriate.    Assessment/Plan:     Eva was seen today for shoulder pain and letter for school/work.    Diagnoses and all orders for this visit:    Chronic pain of both shoulders  -     DX-SHOULDER 2+ LEFT; Future  -     DX-SHOULDER 2+ RIGHT; Future  -     Referral to Physical Therapy    We will obtain bilateral shoulder x-rays and refer patient to physical therapy for further evaluation.    Frequent bowel movements  -     dicyclomine (BENTYL) 10 MG Cap; Take 1 Capsule by mouth 2 (two) times a day.    Possible irritable bowel.  We will try dicyclomine but will start with twice a day to see if this is effective for patient.  Follow-up in approximately 2 weeks.  Note provided for employer that this is under investigation.    Birth control counseling  -     Norgestim-Eth Estrad Triphasic (TRI-SPRINTEC) 0.18/0.215/0.25 MG-35 MCG Tab; TAKE " ONE TABLET BY MOUTH DAILY BEGIN MEDICATION THE FIRST DAY OF YOUR NEXT MENSTRUL PERIOD      Medication refilled.    Patient complains of headaches and requesting vaccines to be done at next visit.  Advised patient that we will follow-up with migraines as well and patient is in agreement with plan.      Return in about 2 weeks (around 8/15/2023) for medication, bowels and headaches.    Please note that this dictation was created using voice recognition software. I have made every reasonable attempt to correct obvious errors, but expect that there are errors of grammar and possible content that I did not discover before finalizing note.

## 2023-08-14 ENCOUNTER — HOSPITAL ENCOUNTER (OUTPATIENT)
Dept: RADIOLOGY | Facility: MEDICAL CENTER | Age: 23
End: 2023-08-14
Attending: PHYSICIAN ASSISTANT
Payer: COMMERCIAL

## 2023-08-14 DIAGNOSIS — M25.511 CHRONIC PAIN OF BOTH SHOULDERS: ICD-10-CM

## 2023-08-14 DIAGNOSIS — M25.512 CHRONIC PAIN OF BOTH SHOULDERS: ICD-10-CM

## 2023-08-14 DIAGNOSIS — G89.29 CHRONIC PAIN OF BOTH SHOULDERS: ICD-10-CM

## 2023-08-14 PROCEDURE — 73030 X-RAY EXAM OF SHOULDER: CPT | Mod: LT

## 2023-08-22 ENCOUNTER — APPOINTMENT (OUTPATIENT)
Dept: MEDICAL GROUP | Facility: CLINIC | Age: 23
End: 2023-08-22
Payer: COMMERCIAL

## 2023-09-11 ENCOUNTER — OFFICE VISIT (OUTPATIENT)
Dept: MEDICAL GROUP | Facility: CLINIC | Age: 23
End: 2023-09-11
Payer: COMMERCIAL

## 2023-09-11 VITALS
HEART RATE: 79 BPM | WEIGHT: 212.96 LBS | BODY MASS INDEX: 35.48 KG/M2 | OXYGEN SATURATION: 95 % | TEMPERATURE: 97.1 F | HEIGHT: 65 IN | SYSTOLIC BLOOD PRESSURE: 110 MMHG | DIASTOLIC BLOOD PRESSURE: 72 MMHG | RESPIRATION RATE: 18 BRPM

## 2023-09-11 DIAGNOSIS — G43.011 INTRACTABLE MIGRAINE WITHOUT AURA AND WITH STATUS MIGRAINOSUS: ICD-10-CM

## 2023-09-11 DIAGNOSIS — R19.4 FREQUENT BOWEL MOVEMENTS: ICD-10-CM

## 2023-09-11 DIAGNOSIS — M25.511 CHRONIC PAIN OF BOTH SHOULDERS: ICD-10-CM

## 2023-09-11 DIAGNOSIS — G89.29 CHRONIC PAIN OF BOTH SHOULDERS: ICD-10-CM

## 2023-09-11 DIAGNOSIS — M25.512 CHRONIC PAIN OF BOTH SHOULDERS: ICD-10-CM

## 2023-09-11 DIAGNOSIS — R01.1 HEART MURMUR: ICD-10-CM

## 2023-09-11 PROCEDURE — 3078F DIAST BP <80 MM HG: CPT | Performed by: PHYSICIAN ASSISTANT

## 2023-09-11 PROCEDURE — 99214 OFFICE O/P EST MOD 30 MIN: CPT | Performed by: PHYSICIAN ASSISTANT

## 2023-09-11 PROCEDURE — 3074F SYST BP LT 130 MM HG: CPT | Performed by: PHYSICIAN ASSISTANT

## 2023-09-11 RX ORDER — DICYCLOMINE HYDROCHLORIDE 10 MG/1
10 CAPSULE ORAL 3 TIMES DAILY
Qty: 270 CAPSULE | Refills: 0 | Status: SHIPPED | OUTPATIENT
Start: 2023-09-11 | End: 2024-02-05

## 2023-09-11 ASSESSMENT — FIBROSIS 4 INDEX: FIB4 SCORE: 0.21

## 2023-09-11 NOTE — PROGRESS NOTES
cc:  shoulder pain    Subjective:     Eva Yi is a 23 y.o. female presenting for shoulder pain      Patient presents to the office for shoulder pain.  Xrays are normal.  She states that the pain is worse with work but she can have it when she wakes up or at home.   She states that she has not made an appointment with PT as she was not aware that the referral has been approved.      Patient states that the dicyclomine did not help.  She states that her employer is not giving her a hard time but did not notice the medication helped.  She states that symptoms are better at home and worse at work.      Patient states that she is having headaches.  She states that she had a bad one yesterday.  She states that naproxen helps some.  She states that she has wisdom teeth coming through and is wondering if this is the reason.  She denies having migraine medication in the past. She states that she has had headaches in the past and they can correlate with her period.     Review of systems:  See above.   Denies any symptoms unless previously indicated.        Current Outpatient Medications:     dicyclomine (BENTYL) 10 MG Cap, Take 1 Capsule by mouth in the morning, at noon, and at bedtime., Disp: 270 Capsule, Rfl: 0    Norgestim-Eth Estrad Triphasic (TRI-SPRINTEC) 0.18/0.215/0.25 MG-35 MCG Tab, TAKE ONE TABLET BY MOUTH DAILY BEGIN MEDICATION THE FIRST DAY OF YOUR NEXT MENSTRUL PERIOD, Disp: 84 Tablet, Rfl: 2    betamethasone dipropionate (DIPROLENE) 0.05 % Ointment, Apply a small amount to the affected area twice a day no more than 10 to 14 days., Disp: 50 g, Rfl: 0    albuterol 108 (90 Base) MCG/ACT Aero Soln inhalation aerosol, Inhale 1-2 Puffs every 6 hours as needed for Shortness of Breath., Disp: 8.5 g, Rfl: 4    ibuprofen (MOTRIN) 800 MG Tab, Take 1 Tablet by mouth every 8 hours as needed for Moderate Pain. (Patient not taking: Reported on 8/1/2023), Disp: 30 Tablet, Rfl: 0    Allergies, past medical history,  "past surgical history, family history, social history reviewed and updated    Objective:     Vitals: /72 (BP Location: Left arm, Patient Position: Sitting, BP Cuff Size: Adult long)   Pulse 79   Temp 36.2 °C (97.1 °F) (Temporal)   Resp 18   Ht 1.638 m (5' 4.5\")   Wt 96.6 kg (212 lb 15.4 oz)   LMP 09/09/2023 (Exact Date)   SpO2 95%   BMI 35.99 kg/m²   General: Alert, pleasant, NAD  EYES:   PERRL, EOMI, no icterus or pallor.  Conjunctivae and lids normal.   HENT:  Normocephalic.  External ears normal.  Neck supple.    Respiratory: Normal respiratory effort.   Abdomen: obese  Skin: Warm, dry, no rashes.  Musculoskeletal: Gait is normal.  Moves all extremities well.    Extremities: normal range of motion all extremities.   Neurological: No tremors, sensation grossly intact, CN2-12 intact.  Psych:  Affect/mood is normal, judgement is good, memory is intact, grooming is appropriate.    TECHNIQUE/EXAM DESCRIPTION AND NUMBER OF VIEWS:  3 views of the LEFT shoulder.     COMPARISON: 2/25/2021     FINDINGS:     No acute fracture or dislocation.  No joint osteoarthritis.        IMPRESSION:        1. No acute osseous abnormality.    Narrative & Impression     8/14/2023 2:07 PM     HISTORY/REASON FOR EXAM:  Pain/Deformity Following Trauma        TECHNIQUE/EXAM DESCRIPTION AND NUMBER OF VIEWS:  3 views of the RIGHT shoulder.     COMPARISON: None     FINDINGS:     No acute fracture or dislocation.  No joint osteoarthritis.        IMPRESSION:        1. No acute osseous abnormality.      Assessment/Plan:     Eva was seen today for medication management and results.    Diagnoses and all orders for this visit:    Chronic pain of both shoulders    Seems to be worse with work related movement.  Physical therapy is still recommended.  Provided patient with contact information so she can schedule an appointment.  If no improvement with PT, will consider MRI.    Frequent bowel movements  -     dicyclomine (BENTYL) 10 MG " Cap; Take 1 Capsule by mouth in the morning, at noon, and at bedtime.    Better at home, worse at work.  Will increase to 3 times a day for days patient is at work follow-up 4 to 8 weeks.    Intractable migraine without aura and with status migrainosus  Murmur  -     EC-ECHOCARDIOGRAM COMPLETE W/O CONT; Future    Believe patient is experiencing migraines.  However she also has a history of a heart murmur.  patient indicates her last echo was several years ago and she would like to obtain a current echo before starting any medication such as sumatriptan.      Return in about 3 months (around 12/11/2023), or if symptoms worsen or fail to improve.    Please note that this dictation was created using voice recognition software. I have made every reasonable attempt to correct obvious errors, but expect that there are errors of grammar and possible content that I did not discover before finalizing note.

## 2023-10-20 ENCOUNTER — APPOINTMENT (OUTPATIENT)
Dept: RADIOLOGY | Facility: IMAGING CENTER | Age: 23
End: 2023-10-20
Payer: COMMERCIAL

## 2023-10-20 ENCOUNTER — OFFICE VISIT (OUTPATIENT)
Dept: URGENT CARE | Facility: CLINIC | Age: 23
End: 2023-10-20
Payer: COMMERCIAL

## 2023-10-20 ENCOUNTER — NON-PROVIDER VISIT (OUTPATIENT)
Dept: URGENT CARE | Facility: PHYSICIAN GROUP | Age: 23
End: 2023-10-20
Payer: COMMERCIAL

## 2023-10-20 VITALS
SYSTOLIC BLOOD PRESSURE: 118 MMHG | BODY MASS INDEX: 36.77 KG/M2 | WEIGHT: 215.39 LBS | TEMPERATURE: 98 F | OXYGEN SATURATION: 98 % | RESPIRATION RATE: 16 BRPM | HEART RATE: 85 BPM | HEIGHT: 64 IN | DIASTOLIC BLOOD PRESSURE: 78 MMHG

## 2023-10-20 DIAGNOSIS — M25.572 ACUTE LEFT ANKLE PAIN: ICD-10-CM

## 2023-10-20 DIAGNOSIS — M25.512 ACUTE PAIN OF LEFT SHOULDER: ICD-10-CM

## 2023-10-20 DIAGNOSIS — M25.572 ACUTE LEFT ANKLE PAIN: Primary | ICD-10-CM

## 2023-10-20 PROCEDURE — 73030 X-RAY EXAM OF SHOULDER: CPT | Mod: TC,FY,LT | Performed by: FAMILY MEDICINE

## 2023-10-20 PROCEDURE — 99213 OFFICE O/P EST LOW 20 MIN: CPT

## 2023-10-20 PROCEDURE — 3074F SYST BP LT 130 MM HG: CPT

## 2023-10-20 PROCEDURE — 73610 X-RAY EXAM OF ANKLE: CPT | Mod: TC,FY,LT | Performed by: FAMILY MEDICINE

## 2023-10-20 PROCEDURE — 3078F DIAST BP <80 MM HG: CPT

## 2023-10-20 RX ORDER — CLINDAMYCIN PHOSPHATE 10 UG/ML
LOTION TOPICAL
COMMUNITY
Start: 2023-10-17

## 2023-10-20 RX ORDER — BETAMETHASONE DIPROPIONATE 0.5 MG/G
CREAM TOPICAL
COMMUNITY
Start: 2023-10-17

## 2023-10-20 ASSESSMENT — ENCOUNTER SYMPTOMS
BLURRED VISION: 0
HEADACHES: 0
DOUBLE VISION: 0
DIZZINESS: 0
FEVER: 0
ABDOMINAL PAIN: 0
CHILLS: 0
COUGH: 0
PALPITATIONS: 0
NAUSEA: 0
VOMITING: 0

## 2023-10-20 ASSESSMENT — FIBROSIS 4 INDEX: FIB4 SCORE: 0.21

## 2023-10-20 NOTE — LETTER
October 20, 2023    To Whom It May Concern:         This is confirmation that Eva Yi attended her scheduled appointment with SARAH Pickard on 10/20/23. She is medically excused from work from 10/19/2023 through 1010/21/2023. She may return to work on 10/22/223.          If you have any questions please do not hesitate to call me at the phone number listed below.    Sincerely,          JS Pickard.  [phone]

## 2023-10-20 NOTE — PROGRESS NOTES
Subjective:   Eva Yi is a 23 y.o. female who presents for Fall (Fell down her stairs 3 days ago, pain is getting worse), Ankle Injury (left), and Shoulder Pain (Left side)          I introduced myself to the patient and informed them that I am a family nurse practitioner.    HPI:Eva comes in today c/o L ankle and L shoulder pain. Onset was 3 days ago when she fell down 3 steps getting down off her deck outside. Patient describes symptoms as constant. They describe the pain as aching. Aggravating factors include walking, WB, lifting, moving her shoulder. Relieving factors include rest. Treatments tried at home include  ibuprofen with some relief .They describe their symptoms as moderate. She is on BC and states she just finished her period and is not pregnant.  She denies any head injury or neck injury when she fell, and denies any head or neck pain currently.  She denies losing consciousness, or vomiting after the fall.  She denies any blood in her urine, blood in her stool, vomiting blood.      Review of Systems   Constitutional:  Negative for chills, fever and malaise/fatigue.   Eyes:  Negative for blurred vision and double vision.   Respiratory:  Negative for cough.    Cardiovascular:  Negative for chest pain and palpitations.   Gastrointestinal:  Negative for abdominal pain, nausea and vomiting.   Musculoskeletal:  Positive for joint pain.        Positive for L ankle and L shoulder pain   Neurological:  Negative for dizziness and headaches.       Medications: albuterol Aers  Aug Betamethasone Dipropionate Crea  betamethasone dipropionate Oint  CLINDAMYCIN PHOSPHATE(TOPICAL) Lotn  dicyclomine Caps  ibuprofen Tabs  Norgestim-Eth Estrad Triphasic Tabs    Allergies: Pcn [penicillins]    Problem List: does not have any pertinent problems on file.    Surgical History:  No past surgical history on file.    Past Social Hx:   reports that she has never smoked. She has never used smokeless tobacco. She  "reports that she does not drink alcohol and does not use drugs.     Past Family Hx:   family history includes Alcohol/Drug in her mother; Hypertension in her paternal grandmother; No Known Problems in her father; Other in her mother; Psychiatric Illness in her mother.     Problem list, medications, and allergies reviewed by myself today in Epic.     Objective:     /78   Pulse 85   Temp 36.7 °C (98 °F) (Temporal)   Resp 16   Ht 1.613 m (5' 3.5\")   Wt 97.7 kg (215 lb 6.2 oz)   SpO2 98%   BMI 37.56 kg/m²     During this visit, appropriate PPE was worn, and hand hygiene was performed.    Physical Exam  Vitals reviewed.   Constitutional:       General: She is not in acute distress.     Appearance: Normal appearance. She is not ill-appearing or toxic-appearing.   HENT:      Head: Normocephalic and atraumatic.      Right Ear: External ear normal.      Left Ear: External ear normal.      Nose: No congestion or rhinorrhea.      Mouth/Throat:      Pharynx: Oropharynx is clear.   Eyes:      General:         Right eye: No discharge.         Left eye: No discharge.      Conjunctiva/sclera: Conjunctivae normal.      Pupils: Pupils are equal, round, and reactive to light.   Cardiovascular:      Rate and Rhythm: Normal rate and regular rhythm.      Heart sounds: Normal heart sounds. No murmur heard.     No friction rub. No gallop.   Pulmonary:      Breath sounds: Normal breath sounds. No wheezing, rhonchi or rales.   Abdominal:      General: There is no distension.   Musculoskeletal:         General: Normal range of motion.      Cervical back: Normal range of motion. No rigidity.      Right lower leg: No edema.      Left lower leg: No edema.      Comments: There is TTP to left shoulder, AC joint, lateral and anterior deltoid, rear deltoid area.  There is no obvious sign of injury, no erythema, edema, cuts or scrapes, or ecchymosis present.  There are no obvious deformities or step-offs palpated.  Empty can pos, " external rotation resistance test pos, drop arm test positive, Appley scratch/pushoff neg, painful arc test negative, .  L ankle - TTP at the lateral malleolus, pain with plantar flexion and inversion.  No joint line tenderness.  There is no obvious sign of injury, no erythema, edema, cuts or scrapes, or ecchymosis present.  There are no obvious deformities or step-offs palpated.  NVID.       Lymphadenopathy:      Cervical: No cervical adenopathy.   Skin:     General: Skin is warm and dry.      Coloration: Skin is not jaundiced.   Neurological:      Mental Status: She is alert and oriented to person, place, and time. Mental status is at baseline.   Psychiatric:         Mood and Affect: Mood normal.         Behavior: Behavior normal.         RADIOLOGY RESULTS   DX-ANKLE 3+ VIEWS LEFT    Result Date: 10/20/2023  10/20/2023 9:52 AM HISTORY/REASON FOR EXAM:  Pain following trauma. TECHNIQUE/EXAM DESCRIPTION AND NUMBER OF VIEWS:  3 views of the LEFT ankle. COMPARISON: None. FINDINGS: BONE MINERALIZATION: Normal. JOINTS: Preserved. No erosions. FRACTURE: None. DISLOCATION: None. SOFT TISSUES: No mass.     No fracture or dislocation.    DX-SHOULDER 2+ LEFT    Result Date: 10/20/2023  10/20/2023 9:52 AM HISTORY/REASON FOR EXAM:  Pain following trauma. TECHNIQUE/EXAM DESCRIPTION AND NUMBER OF VIEWS:  3 views of the LEFT shoulder. COMPARISON: None FINDINGS: BONE MINERALIZATION: Normal. JOINTS: Preserved. No erosions. FRACTURE: None. DISLOCATION: None. SOFT TISSUES: No mass.     No fracture or dislocation.            Assessment/Plan:     Diagnosis and associated orders:      Comments/MDM:     1. Acute pain of left shoulder  I did discuss with patient that the x-ray results are negative for any fracture or dislocation.  We discussed possible differential diagnoses such as AC separation, glenohumeral OA or bone spur, rotator cuff injury, muscle strain.  I encouraged her to do passive ROM exercises, use arm as tolerated, do not  immobilize in a sling as there is a risk of frozen shoulder.  We discussed RICE and using Tylenol alternated with ibuprofen for pain.  I instructed her I will make a referral to sports medicine for follow-up.  We discussed red flags and when to return to urgent care versus when to go to the emergency room.  She states she understands all instructions and is agreeable with the plan of care  - DX-SHOULDER 2+ LEFT; Future  - DX-ANKLE 3+ VIEWS LEFT; Future  - Referral to Sports Medicine    2. Acute left ankle pain  I did discuss the x-ray results with the patient and inform her that there are no fractures or dislocations.  We did discuss differential diagnosis including strain and sprain as well as occult fractures that do not show up immediately on x-ray.  As above, we discussed RICE and using Tylenol alternated with ibuprofen for pain.  I instructed her I will make a referral to sports medicine for follow-up.  We discussed red flags and when to return to urgent care versus when to go to the emergency room.  I did instruct her how to monitor the color, movement, and sensation of her distal digits.  RICE  Ace wrap to ankle for compression  Tall Ortho boot to immobilize ankle  WBAT  Patient was able to walk comfortably in the boot and did not need crutches  Ducted her to follow-up with sports medicine   She states she understands all instructions and is agreeable with the plan of care  - DX-SHOULDER 2+ LEFT; Future  - DX-ANKLE 3+ VIEWS LEFT; Future  - Referral to Sports Medicine         Pt is clinically stable at today's acute urgent care visit. Vital signs are normal and reassuring.  No acute distress noted. Appropriate for outpatient management at this time.       Discussed DDx, management options (risks,benefits, and alternatives to planned treatment), natural progression and supportive care.  Patient states they have good understanding and the treatment plan was agreed upon. Questions were encouraged and answered    Return to urgent care prn if new or worsening sx or if there is no improvement in condition prn.    Advised the patient to follow-up with the primary care physician for recheck, reevaluation, and consideration of further management.  I instructed patient to get a pharmacy consult when picking up any prescribed medications.  Strict ER precautions discussed for any escalating pain, loss of color, movement, sensation of distal digits, any redness, swelling, localized heat, pain of the areas, especially with fever, chills, nausea and vomiting, lethargy.  Patient states they understand all instructions.     I personally reviewed prior external notes and test results pertinent to today's visit.  I have independently reviewed and interpreted all diagnostics ordered during this urgent care acute visit.        Please note that this dictation was created using voice recognition software. I have made a reasonable attempt to correct obvious errors, but I expect that there are errors of grammar and possibly content that I did not discover before finalizing the note.    This note was electronically signed by Oral JAMA, SCOTT, CHERELLE, KEVIN

## 2023-11-02 ENCOUNTER — TELEPHONE (OUTPATIENT)
Dept: HEALTH INFORMATION MANAGEMENT | Facility: OTHER | Age: 23
End: 2023-11-02

## 2023-11-06 ENCOUNTER — TELEPHONE (OUTPATIENT)
Dept: HEALTH INFORMATION MANAGEMENT | Facility: OTHER | Age: 23
End: 2023-11-06

## 2023-11-14 ENCOUNTER — OFFICE VISIT (OUTPATIENT)
Dept: URGENT CARE | Facility: PHYSICIAN GROUP | Age: 23
End: 2023-11-14
Payer: COMMERCIAL

## 2023-11-14 VITALS
HEART RATE: 116 BPM | WEIGHT: 214 LBS | HEIGHT: 63 IN | BODY MASS INDEX: 37.92 KG/M2 | DIASTOLIC BLOOD PRESSURE: 66 MMHG | TEMPERATURE: 97.2 F | OXYGEN SATURATION: 96 % | RESPIRATION RATE: 16 BRPM | SYSTOLIC BLOOD PRESSURE: 126 MMHG

## 2023-11-14 DIAGNOSIS — R68.89 FLU-LIKE SYMPTOMS: ICD-10-CM

## 2023-11-14 DIAGNOSIS — H66.002 NON-RECURRENT ACUTE SUPPURATIVE OTITIS MEDIA OF LEFT EAR WITHOUT SPONTANEOUS RUPTURE OF TYMPANIC MEMBRANE: ICD-10-CM

## 2023-11-14 LAB
FLUAV RNA SPEC QL NAA+PROBE: NEGATIVE
FLUBV RNA SPEC QL NAA+PROBE: NEGATIVE
RSV RNA SPEC QL NAA+PROBE: NEGATIVE
SARS-COV-2 RNA RESP QL NAA+PROBE: POSITIVE

## 2023-11-14 PROCEDURE — 0241U POCT CEPHEID COV-2, FLU A/B, RSV - PCR: CPT | Performed by: FAMILY MEDICINE

## 2023-11-14 PROCEDURE — 99213 OFFICE O/P EST LOW 20 MIN: CPT | Performed by: FAMILY MEDICINE

## 2023-11-14 PROCEDURE — 3074F SYST BP LT 130 MM HG: CPT | Performed by: FAMILY MEDICINE

## 2023-11-14 PROCEDURE — 3078F DIAST BP <80 MM HG: CPT | Performed by: FAMILY MEDICINE

## 2023-11-14 RX ORDER — CLARITHROMYCIN 500 MG/1
500 TABLET, COATED ORAL 2 TIMES DAILY
Qty: 20 TABLET | Refills: 0 | Status: SHIPPED | OUTPATIENT
Start: 2023-11-14 | End: 2023-11-24

## 2023-11-14 ASSESSMENT — ENCOUNTER SYMPTOMS
GASTROINTESTINAL NEGATIVE: 1
CARDIOVASCULAR NEGATIVE: 1
HEADACHES: 1
EYES NEGATIVE: 1
COUGH: 1
SORE THROAT: 1

## 2023-11-14 ASSESSMENT — FIBROSIS 4 INDEX: FIB4 SCORE: 0.21

## 2023-11-14 NOTE — LETTER
[facility]  [facility] [address] [id number]     November 14, 2023    Patient: Eva Yi   YOB: 2000   Date of Visit: 11/14/2023       To Whom It May Concern:    Eva Yi was seen and treated in our department on 11/14/2023. Excuse 11/14-11/19, for covid.    Sincerely,     Nolan Bonilla M.D.

## 2023-11-14 NOTE — PROGRESS NOTES
"Subjective:   Eva Yi is a 23 y.o. female who presents for Pharyngitis, Sinus Problem, Headache, and Body Aches (Sx 1 day)      Pharyngitis   Associated symptoms include congestion, coughing, ear pain and headaches.   Sinus Problem  Associated symptoms include congestion, coughing, ear pain, headaches and a sore throat.   Headache      Review of Systems   Constitutional:  Positive for malaise/fatigue.   HENT:  Positive for congestion, ear pain, hearing loss and sore throat.    Eyes: Negative.    Respiratory:  Positive for cough.    Cardiovascular: Negative.    Gastrointestinal: Negative.    Genitourinary: Negative.    Skin: Negative.    Neurological:  Positive for headaches.       Medications, Allergies, and current problem list reviewed today in Epic.     Objective:     /66   Pulse (!) 116   Temp 36.2 °C (97.2 °F) (Temporal)   Resp 16   Ht 1.6 m (5' 3\")   Wt 97.1 kg (214 lb)   SpO2 96%     Physical Exam  Vitals and nursing note reviewed.   Constitutional:       Appearance: Normal appearance.   HENT:      Head: Normocephalic and atraumatic.      Ears:      Comments: Bulging, red, poor light reflex left tm     Nose: Congestion present.      Mouth/Throat:      Pharynx: Posterior oropharyngeal erythema present.   Cardiovascular:      Rate and Rhythm: Regular rhythm. Tachycardia present.      Pulses: Normal pulses.      Heart sounds: Normal heart sounds.   Pulmonary:      Effort: Pulmonary effort is normal.      Breath sounds: Normal breath sounds.   Abdominal:      General: Abdomen is flat. Bowel sounds are normal.      Palpations: Abdomen is soft.   Lymphadenopathy:      Cervical: Cervical adenopathy present.   Neurological:      Mental Status: She is alert.         Assessment/Plan:     Diagnosis and associated orders:     1. Non-recurrent acute suppurative otitis media of left ear without spontaneous rupture of tympanic membrane  clarithromycin (BIAXIN) 500 MG Tab      2. Flu-like symptoms  " POCT CEPHEID COV-2, FLU A/B, RSV - PCR         Comments/MDM:              Differential diagnosis, natural history, supportive care, and indications for immediate follow-up discussed.    Advised the patient to follow-up with the primary care physician for recheck, reevaluation, and consideration of further management.    Please note that this dictation was created using voice recognition software. I have made a reasonable attempt to correct obvious errors, but I expect that there are errors of grammar and possibly content that I did not discover before finalizing the note.    This note was electronically signed by Nolan Bonilla M.D.

## 2024-01-22 ENCOUNTER — OFFICE VISIT (OUTPATIENT)
Dept: MEDICAL GROUP | Facility: CLINIC | Age: 24
End: 2024-01-22
Payer: COMMERCIAL

## 2024-01-22 VITALS
BODY MASS INDEX: 36.58 KG/M2 | DIASTOLIC BLOOD PRESSURE: 68 MMHG | OXYGEN SATURATION: 97 % | HEART RATE: 77 BPM | HEIGHT: 64 IN | RESPIRATION RATE: 20 BRPM | SYSTOLIC BLOOD PRESSURE: 118 MMHG | TEMPERATURE: 98 F | WEIGHT: 214.29 LBS

## 2024-01-22 DIAGNOSIS — N92.6 IRREGULAR PERIODS: ICD-10-CM

## 2024-01-22 DIAGNOSIS — N92.6 MENSTRUAL ABNORMALITY: ICD-10-CM

## 2024-01-22 DIAGNOSIS — Z11.4 SCREENING FOR HIV (HUMAN IMMUNODEFICIENCY VIRUS): ICD-10-CM

## 2024-01-22 DIAGNOSIS — Z30.09 BIRTH CONTROL COUNSELING: ICD-10-CM

## 2024-01-22 DIAGNOSIS — Z11.59 NEED FOR HEPATITIS C SCREENING TEST: ICD-10-CM

## 2024-01-22 DIAGNOSIS — G43.011 INTRACTABLE MIGRAINE WITHOUT AURA AND WITH STATUS MIGRAINOSUS: ICD-10-CM

## 2024-01-22 DIAGNOSIS — R19.4 FREQUENT BOWEL MOVEMENTS: ICD-10-CM

## 2024-01-22 LAB
POCT INT CON NEG: NEGATIVE
POCT INT CON POS: POSITIVE
POCT URINE PREGNANCY TEST: NEGATIVE

## 2024-01-22 PROCEDURE — 99214 OFFICE O/P EST MOD 30 MIN: CPT | Performed by: PHYSICIAN ASSISTANT

## 2024-01-22 PROCEDURE — 3078F DIAST BP <80 MM HG: CPT | Performed by: PHYSICIAN ASSISTANT

## 2024-01-22 PROCEDURE — 81025 URINE PREGNANCY TEST: CPT | Performed by: PHYSICIAN ASSISTANT

## 2024-01-22 PROCEDURE — 3074F SYST BP LT 130 MM HG: CPT | Performed by: PHYSICIAN ASSISTANT

## 2024-01-22 ASSESSMENT — FIBROSIS 4 INDEX: FIB4 SCORE: 0.21

## 2024-01-22 ASSESSMENT — PATIENT HEALTH QUESTIONNAIRE - PHQ9: CLINICAL INTERPRETATION OF PHQ2 SCORE: 0

## 2024-01-22 NOTE — PROGRESS NOTES
cc:  irregular period    Subjective:     Eva Yi is a 23 y.o. female presenting for irregular period      Patient presents to the office for irregular period.  Patient states that she was spotting from the 19th to the 30th.  Then she bled for one day and then stopped.  She states that she is supposed to start her next cycle in one week.  She thinks she may have missed a dose after the spotting occurred. She states that this is the 3rd year she has been on the birth control and she has not had irregularities in the past.  She states that she has heavy periods with frequent clotting and will usually have a period for 7 days.    Patient states that she did not start the dicyclomine as she misplaced her medication.  She states that work changed and she was doing better.  However, she is wanting an updated note for work as she is being asked about frequent bathroom breaks.  She states that she has never tried the dicyclomine.      Patient states that she has been using Excedrin for migraines which has helped and has not yet had the echo done.   She is wanting the information for the echo.    Patient has not scheduled with sports medicine of for her echo and is requesting the information.    Review of systems:  See above.   Denies any symptoms unless previously indicated.        Current Outpatient Medications:     Aug Betamethasone Dipropionate (DIPROLENE-AF) 0.05 % Cream, , Disp: , Rfl:     CLINDAMYCIN PHOSPHATE,TOPICAL, 1 % Lotion, , Disp: , Rfl:     Norgestim-Eth Estrad Triphasic (TRI-SPRINTEC) 0.18/0.215/0.25 MG-35 MCG Tab, TAKE ONE TABLET BY MOUTH DAILY BEGIN MEDICATION THE FIRST DAY OF YOUR NEXT MENSTRUL PERIOD, Disp: 84 Tablet, Rfl: 2    betamethasone dipropionate (DIPROLENE) 0.05 % Ointment, Apply a small amount to the affected area twice a day no more than 10 to 14 days., Disp: 50 g, Rfl: 0    albuterol 108 (90 Base) MCG/ACT Aero Soln inhalation aerosol, Inhale 1-2 Puffs every 6 hours as needed for  "Shortness of Breath., Disp: 8.5 g, Rfl: 4    ibuprofen (MOTRIN) 800 MG Tab, Take 1 Tablet by mouth every 8 hours as needed for Moderate Pain., Disp: 30 Tablet, Rfl: 0    dicyclomine (BENTYL) 10 MG Cap, Take 1 Capsule by mouth in the morning, at noon, and at bedtime. (Patient not taking: Reported on 1/22/2024), Disp: 270 Capsule, Rfl: 0    Allergies, past medical history, past surgical history, family history, social history reviewed and updated    Objective:     Vitals: /68 (BP Location: Left arm, Patient Position: Sitting, BP Cuff Size: Adult)   Pulse 77   Temp 36.7 °C (98 °F) (Temporal)   Resp 20   Ht 1.626 m (5' 4\")   Wt 97.2 kg (214 lb 4.6 oz)   LMP 12/20/2023   SpO2 97%   BMI 36.78 kg/m²   General: Alert, pleasant, NAD  EYES:   PERRL, EOMI, no icterus or pallor.  Conjunctivae and lids normal.   HENT:  Normocephalic.  External ears normal. Neck supple.   Respiratory: Normal respiratory effort.    Abdomen: obese  Skin: Warm, dry, no rashes.  Musculoskeletal: Gait is normal.  Moves all extremities well.    Extremities: Normal range of motion all extremities.   Neurological: No tremors, sensation grossly intact, patellar  intact.  Psych:  Affect/mood is normal, judgement is good, memory is intact, grooming is appropriate.  Pregnancy test:  negative.    Assessment/Plan:     Eva was seen today for menstrual problem.    Diagnoses and all orders for this visit:    Irregular periods  Menstrual abnormality  -     POCT Pregnancy  -     CBC WITH DIFFERENTIAL; Future  -     Comp Metabolic Panel; Future  -     TSH WITH REFLEX TO FT4; Future  -     FSH/LH; Future  Birth control counseling    Difficult to know if there is an irregularity based on 1 cycle.  Discussed obtaining a pelvic ultrasound which patient declined.  Will obtain labs to evaluate further and follow-up in 3 to 4 weeks.    Frequent bowel movements    Recommend patient try dicyclomine which she has recently located.    Intractable migraine " without aura and with status migrainosus    Patient given information to schedule echo.    Screening for HIV (human immunodeficiency virus)  -     HIV AG/AB COMBO ASSAY SCREENING; Future  Need for hepatitis C screening test  -     HEP C VIRUS ANTIBODY; Future    Further testing ordered to evaluate further.        Return in about 4 weeks (around 2/19/2024), or if symptoms worsen or fail to improve, for 3-4 weeks. labs qand dicyclomine.    Please note that this dictation was created using voice recognition software. I have made every reasonable attempt to correct obvious errors, but expect that there are errors of grammar and possible content that I did not discover before finalizing note.

## 2024-01-22 NOTE — LETTER
January 22, 2024       Patient: Eva Yi   YOB: 2000   Date of Visit: 1/22/2024         To Whom It May Concern:    In my medical opinion, Eva Yi is undergoing further medical evaluation for frequent restroom breaks.    If you have any questions or concerns, please don't hesitate to call [phone]          Sincerely,          Brenda Ashford P.A.-C.  Electronically Signed

## 2024-02-05 ENCOUNTER — OFFICE VISIT (OUTPATIENT)
Dept: URGENT CARE | Facility: PHYSICIAN GROUP | Age: 24
End: 2024-02-05
Payer: COMMERCIAL

## 2024-02-05 VITALS
RESPIRATION RATE: 16 BRPM | SYSTOLIC BLOOD PRESSURE: 112 MMHG | WEIGHT: 212 LBS | TEMPERATURE: 97.2 F | BODY MASS INDEX: 36.19 KG/M2 | HEART RATE: 90 BPM | DIASTOLIC BLOOD PRESSURE: 74 MMHG | HEIGHT: 64 IN | OXYGEN SATURATION: 97 %

## 2024-02-05 DIAGNOSIS — J32.9 RHINOSINUSITIS: ICD-10-CM

## 2024-02-05 DIAGNOSIS — R05.1 ACUTE COUGH: ICD-10-CM

## 2024-02-05 DIAGNOSIS — J02.9 PHARYNGITIS, UNSPECIFIED ETIOLOGY: ICD-10-CM

## 2024-02-05 LAB
FLUAV RNA SPEC QL NAA+PROBE: NEGATIVE
FLUBV RNA SPEC QL NAA+PROBE: NEGATIVE
RSV RNA SPEC QL NAA+PROBE: NEGATIVE
S PYO DNA SPEC NAA+PROBE: NOT DETECTED
SARS-COV-2 RNA RESP QL NAA+PROBE: NEGATIVE

## 2024-02-05 PROCEDURE — 0241U POCT CEPHEID COV-2, FLU A/B, RSV - PCR: CPT | Performed by: FAMILY MEDICINE

## 2024-02-05 PROCEDURE — 3074F SYST BP LT 130 MM HG: CPT | Performed by: FAMILY MEDICINE

## 2024-02-05 PROCEDURE — 3078F DIAST BP <80 MM HG: CPT | Performed by: FAMILY MEDICINE

## 2024-02-05 PROCEDURE — 99213 OFFICE O/P EST LOW 20 MIN: CPT | Performed by: FAMILY MEDICINE

## 2024-02-05 PROCEDURE — 87651 STREP A DNA AMP PROBE: CPT | Performed by: FAMILY MEDICINE

## 2024-02-05 RX ORDER — CEFDINIR 300 MG/1
300 CAPSULE ORAL 2 TIMES DAILY
Qty: 14 CAPSULE | Refills: 0 | Status: SHIPPED | OUTPATIENT
Start: 2024-02-05 | End: 2024-02-12

## 2024-02-05 ASSESSMENT — ENCOUNTER SYMPTOMS
EYE REDNESS: 0
EYE DISCHARGE: 0
MYALGIAS: 0
WEIGHT LOSS: 0
VOMITING: 0
NAUSEA: 0

## 2024-02-05 ASSESSMENT — FIBROSIS 4 INDEX: FIB4 SCORE: 0.21

## 2024-02-05 NOTE — PROGRESS NOTES
"Subjective     Eva Miri Yi is a 23 y.o. female who presents with Sore Throat (X yesterday with congestion, mild cough, migraine. Hx of [place].)            Onset yesterday ST, HA/PMH migraine. Sinus pressure and drainage. PMH sinusitis. Mild cough. No SOB/wheeze. No fever/chills. No known exposures. No other aggravating or alleviating factors.          Review of Systems   Constitutional:  Positive for malaise/fatigue. Negative for weight loss.   Eyes:  Negative for discharge and redness.   Gastrointestinal:  Negative for nausea and vomiting.   Musculoskeletal:  Negative for joint pain and myalgias.   Skin:  Negative for itching and rash.              Objective     /74   Pulse 90   Temp 36.2 °C (97.2 °F) (Temporal)   Resp 16   Ht 1.626 m (5' 4\")   Wt 96.2 kg (212 lb)   LMP 01/29/2024   SpO2 97%   BMI 36.39 kg/m²      Physical Exam  Constitutional:       General: She is not in acute distress.     Appearance: She is well-developed.   HENT:      Head: Normocephalic and atraumatic.      Right Ear: Tympanic membrane normal.      Left Ear: Tympanic membrane normal.      Nose: Congestion present.      Mouth/Throat:      Pharynx: Posterior oropharyngeal erythema present.      Comments: PND  Eyes:      Conjunctiva/sclera: Conjunctivae normal.   Cardiovascular:      Rate and Rhythm: Normal rate and regular rhythm.      Heart sounds: Normal heart sounds. No murmur heard.  Pulmonary:      Effort: Pulmonary effort is normal.      Breath sounds: Normal breath sounds. No wheezing.   Skin:     General: Skin is warm and dry.      Findings: No rash.   Neurological:      Mental Status: She is alert.                             Assessment & Plan      POCT PCR strep negative  POCT PCR COVID-19, influenza, and RSV negative    1. Pharyngitis, unspecified etiology  POCT CEPHEID GROUP A STREP - PCR    POCT CEPHEID COV-2, FLU A/B, RSV - PCR      2. Acute cough  POCT CEPHEID COV-2, FLU A/B, RSV - PCR      3. Rhinosinusitis  " cefdinir (OMNICEF) 300 MG Cap        Differential diagnosis, natural history, supportive care, and indications for immediate follow-up were discussed.     Nasal saline, nasal corticosteroid    Contingent antibiotic prescription given to patient to fill upon meeting criteria of guidelines discussed.

## 2024-02-08 ENCOUNTER — HOSPITAL ENCOUNTER (OUTPATIENT)
Facility: MEDICAL CENTER | Age: 24
End: 2024-02-08
Attending: PHYSICIAN ASSISTANT
Payer: COMMERCIAL

## 2024-02-08 ENCOUNTER — OFFICE VISIT (OUTPATIENT)
Dept: URGENT CARE | Facility: PHYSICIAN GROUP | Age: 24
End: 2024-02-08
Payer: COMMERCIAL

## 2024-02-08 ENCOUNTER — NON-PROVIDER VISIT (OUTPATIENT)
Dept: MEDICAL GROUP | Facility: CLINIC | Age: 24
End: 2024-02-08
Payer: COMMERCIAL

## 2024-02-08 VITALS
HEART RATE: 89 BPM | WEIGHT: 212 LBS | HEIGHT: 64 IN | OXYGEN SATURATION: 97 % | TEMPERATURE: 97.6 F | RESPIRATION RATE: 16 BRPM | SYSTOLIC BLOOD PRESSURE: 112 MMHG | BODY MASS INDEX: 36.19 KG/M2 | DIASTOLIC BLOOD PRESSURE: 72 MMHG

## 2024-02-08 DIAGNOSIS — Z11.59 NEED FOR HEPATITIS C SCREENING TEST: ICD-10-CM

## 2024-02-08 DIAGNOSIS — Z11.4 SCREENING FOR HIV (HUMAN IMMUNODEFICIENCY VIRUS): ICD-10-CM

## 2024-02-08 DIAGNOSIS — N92.6 MENSTRUAL ABNORMALITY: ICD-10-CM

## 2024-02-08 DIAGNOSIS — J06.9 URI WITH COUGH AND CONGESTION: ICD-10-CM

## 2024-02-08 LAB
ALBUMIN SERPL BCP-MCNC: 4.3 G/DL (ref 3.2–4.9)
ALBUMIN/GLOB SERPL: 1.2 G/DL
ALP SERPL-CCNC: 106 U/L (ref 30–99)
ALT SERPL-CCNC: 24 U/L (ref 2–50)
ANION GAP SERPL CALC-SCNC: 15 MMOL/L (ref 7–16)
AST SERPL-CCNC: 19 U/L (ref 12–45)
BASOPHILS # BLD AUTO: 0.5 % (ref 0–1.8)
BASOPHILS # BLD: 0.07 K/UL (ref 0–0.12)
BILIRUB SERPL-MCNC: 0.2 MG/DL (ref 0.1–1.5)
BUN SERPL-MCNC: 11 MG/DL (ref 8–22)
CALCIUM ALBUM COR SERPL-MCNC: 9.5 MG/DL (ref 8.5–10.5)
CALCIUM SERPL-MCNC: 9.7 MG/DL (ref 8.5–10.5)
CHLORIDE SERPL-SCNC: 101 MMOL/L (ref 96–112)
CO2 SERPL-SCNC: 19 MMOL/L (ref 20–33)
CREAT SERPL-MCNC: 0.4 MG/DL (ref 0.5–1.4)
EOSINOPHIL # BLD AUTO: 0.17 K/UL (ref 0–0.51)
EOSINOPHIL NFR BLD: 1.3 % (ref 0–6.9)
ERYTHROCYTE [DISTWIDTH] IN BLOOD BY AUTOMATED COUNT: 38.1 FL (ref 35.9–50)
FSH SERPL-ACNC: 5.5 MIU/ML
GFR SERPLBLD CREATININE-BSD FMLA CKD-EPI: 144 ML/MIN/1.73 M 2
GLOBULIN SER CALC-MCNC: 3.6 G/DL (ref 1.9–3.5)
GLUCOSE SERPL-MCNC: 80 MG/DL (ref 65–99)
HCT VFR BLD AUTO: 43.5 % (ref 37–47)
HCV AB SER QL: NORMAL
HGB BLD-MCNC: 14.3 G/DL (ref 12–16)
HIV 1+2 AB+HIV1 P24 AG SERPL QL IA: NORMAL
IMM GRANULOCYTES # BLD AUTO: 0.04 K/UL (ref 0–0.11)
IMM GRANULOCYTES NFR BLD AUTO: 0.3 % (ref 0–0.9)
LH SERPL-ACNC: 6.1 IU/L
LYMPHOCYTES # BLD AUTO: 2.93 K/UL (ref 1–4.8)
LYMPHOCYTES NFR BLD: 22.4 % (ref 22–41)
MCH RBC QN AUTO: 28.4 PG (ref 27–33)
MCHC RBC AUTO-ENTMCNC: 32.9 G/DL (ref 32.2–35.5)
MCV RBC AUTO: 86.3 FL (ref 81.4–97.8)
MONOCYTES # BLD AUTO: 0.8 K/UL (ref 0–0.85)
MONOCYTES NFR BLD AUTO: 6.1 % (ref 0–13.4)
NEUTROPHILS # BLD AUTO: 9.06 K/UL (ref 1.82–7.42)
NEUTROPHILS NFR BLD: 69.4 % (ref 44–72)
NRBC # BLD AUTO: 0 K/UL
NRBC BLD-RTO: 0 /100 WBC (ref 0–0.2)
PLATELET # BLD AUTO: 358 K/UL (ref 164–446)
PMV BLD AUTO: 10.3 FL (ref 9–12.9)
POTASSIUM SERPL-SCNC: 4 MMOL/L (ref 3.6–5.5)
PROT SERPL-MCNC: 7.9 G/DL (ref 6–8.2)
RBC # BLD AUTO: 5.04 M/UL (ref 4.2–5.4)
SODIUM SERPL-SCNC: 135 MMOL/L (ref 135–145)
T4 FREE SERPL-MCNC: 1.07 NG/DL (ref 0.93–1.7)
TSH SERPL DL<=0.005 MIU/L-ACNC: 6.73 UIU/ML (ref 0.38–5.33)
WBC # BLD AUTO: 13.1 K/UL (ref 4.8–10.8)

## 2024-02-08 PROCEDURE — 80053 COMPREHEN METABOLIC PANEL: CPT

## 2024-02-08 PROCEDURE — 83002 ASSAY OF GONADOTROPIN (LH): CPT

## 2024-02-08 PROCEDURE — 3074F SYST BP LT 130 MM HG: CPT | Performed by: NURSE PRACTITIONER

## 2024-02-08 PROCEDURE — 85025 COMPLETE CBC W/AUTO DIFF WBC: CPT

## 2024-02-08 PROCEDURE — 3078F DIAST BP <80 MM HG: CPT | Performed by: NURSE PRACTITIONER

## 2024-02-08 PROCEDURE — 86803 HEPATITIS C AB TEST: CPT

## 2024-02-08 PROCEDURE — 83001 ASSAY OF GONADOTROPIN (FSH): CPT

## 2024-02-08 PROCEDURE — 84439 ASSAY OF FREE THYROXINE: CPT

## 2024-02-08 PROCEDURE — 99213 OFFICE O/P EST LOW 20 MIN: CPT | Performed by: NURSE PRACTITIONER

## 2024-02-08 PROCEDURE — 84443 ASSAY THYROID STIM HORMONE: CPT

## 2024-02-08 PROCEDURE — 87389 HIV-1 AG W/HIV-1&-2 AB AG IA: CPT

## 2024-02-08 PROCEDURE — 36415 COLL VENOUS BLD VENIPUNCTURE: CPT | Performed by: PHYSICIAN ASSISTANT

## 2024-02-08 RX ORDER — DEXTROMETHORPHAN HYDROBROMIDE AND PROMETHAZINE HYDROCHLORIDE 15; 6.25 MG/5ML; MG/5ML
5 SYRUP ORAL EVERY 4 HOURS PRN
Qty: 120 ML | Refills: 0 | Status: SHIPPED | OUTPATIENT
Start: 2024-02-08 | End: 2024-02-15

## 2024-02-08 RX ORDER — METHYLPREDNISOLONE 4 MG/1
TABLET ORAL
Qty: 21 TABLET | Refills: 0 | Status: SHIPPED | OUTPATIENT
Start: 2024-02-08

## 2024-02-08 ASSESSMENT — FIBROSIS 4 INDEX: FIB4 SCORE: 0.21

## 2024-02-08 ASSESSMENT — ENCOUNTER SYMPTOMS
ABDOMINAL PAIN: 0
COUGH: 1
DIARRHEA: 0
NAUSEA: 0
SORE THROAT: 1
VOMITING: 0
SINUS PAIN: 1

## 2024-02-08 NOTE — PROGRESS NOTES
"Subjective:     Eva Yi is a 23 y.o. female who presents for Letter for School/Work (Wants clearance for a week)      HPI  Pt presents for evaluation of a new problem. Eva is a very pleasant 23-year-old female presents to urgent care today with complaints of 4 days of URI illness.  She complains of a sore throat, cough, congestion and fatigue.  She was seen in the clinic 3 days ago and tested negative for COVID, flu, RSV and strep throat.  She initially declined a work note when seen on Monday however, as her symptoms are remaining persistent she is now in need of a note for work.  She has been using DayQuil/NyQuil with honey for relief of her symptoms with little to no relief.  She denies any known ill contacts.    Review of Systems   Constitutional:  Positive for malaise/fatigue.   HENT:  Positive for congestion, sinus pain and sore throat. Negative for ear pain.    Respiratory:  Positive for cough.    Gastrointestinal:  Negative for abdominal pain, diarrhea, nausea and vomiting.       PMH:   Past Medical History:   Diagnosis Date    ADHD (attention deficit hyperactivity disorder)     Allergy     ASTHMA     Heart murmur      ALLERGIES:   Allergies   Allergen Reactions    Pcn [Penicillins] Hives     SURGHX: No past surgical history on file.  SOCHX:   Social History     Socioeconomic History    Marital status: Single   Tobacco Use    Smoking status: Never    Smokeless tobacco: Never   Vaping Use    Vaping Use: Never used   Substance and Sexual Activity    Alcohol use: No     Alcohol/week: 0.0 oz    Drug use: No    Sexual activity: Never     Birth control/protection: Pill     FH:   Family History   Problem Relation Age of Onset    Alcohol/Drug Mother     Psychiatric Illness Mother     Other Mother     No Known Problems Father     Hypertension Paternal Grandmother          Objective:   /72   Pulse 89   Temp 36.4 °C (97.6 °F) (Temporal)   Resp 16   Ht 1.626 m (5' 4\")   Wt 96.2 kg (212 lb)   " LMP 01/29/2024   SpO2 97%   BMI 36.39 kg/m²     Physical Exam  Vitals and nursing note reviewed.   Constitutional:       General: She is not in acute distress.     Appearance: Normal appearance. She is ill-appearing.   HENT:      Head: Normocephalic and atraumatic.      Right Ear: Tympanic membrane, ear canal and external ear normal.      Left Ear: Tympanic membrane, ear canal and external ear normal.      Nose: Congestion present. No rhinorrhea.      Mouth/Throat:      Mouth: Mucous membranes are moist.      Pharynx: Posterior oropharyngeal erythema present. No oropharyngeal exudate.   Eyes:      Extraocular Movements: Extraocular movements intact.      Pupils: Pupils are equal, round, and reactive to light.   Cardiovascular:      Rate and Rhythm: Normal rate and regular rhythm.      Pulses: Normal pulses.      Heart sounds: Normal heart sounds.   Pulmonary:      Effort: Pulmonary effort is normal. No respiratory distress.      Breath sounds: Normal breath sounds. No stridor. No wheezing, rhonchi or rales.   Chest:      Chest wall: No tenderness.   Abdominal:      General: Abdomen is flat. Bowel sounds are normal.      Palpations: Abdomen is soft.      Tenderness: There is no abdominal tenderness. There is no right CVA tenderness or left CVA tenderness.   Musculoskeletal:         General: Normal range of motion.      Cervical back: Normal range of motion and neck supple. No tenderness.   Lymphadenopathy:      Cervical: No cervical adenopathy.   Skin:     General: Skin is warm and dry.      Capillary Refill: Capillary refill takes less than 2 seconds.   Neurological:      General: No focal deficit present.      Mental Status: She is alert and oriented to person, place, and time. Mental status is at baseline.   Psychiatric:         Mood and Affect: Mood normal.         Behavior: Behavior normal.         Thought Content: Thought content normal.         Judgment: Judgment normal.         Assessment/Plan:   Assessment     1. URI with cough and congestion  methylPREDNISolone (MEDROL DOSEPAK) 4 MG Tablet Therapy Pack    promethazine-dextromethorphan (PROMETHAZINE-DM) 6.25-15 MG/5ML syrup      Prescriptions called into pharmacy. AVS printed and reviewed with pt. Red flags identified of when to seek care back in UC or ER including SOB, increased fever and worsening symptoms. Symptomatic treatment such as OTC Ibuprofen/ Acetaminophen, increased fluids, and humidifier encouraged Pt in agreement with care plan today.

## 2024-02-08 NOTE — LETTER
February 8, 2024    To Whom It May Concern:         This is confirmation that Eva Yi attended her scheduled appointment with SARAH Garcia on 2/08/24. Please excuse her absence starting 2/7/2024 through 2/12/2024 due to an acute illness.         If you have any questions please do not hesitate to call me at the phone number listed below.    Sincerely,          Violette St A.P.R.N.  [phone]

## 2024-02-08 NOTE — PROGRESS NOTES
Eva iY is a 23 y.o. female here for a non-provider visit for a lab draw on 2/8/2024 at 10:08 AM.    Procedure performed:  Venipuncture     Anatomical site:  Left Antecubital Area    Equipment used:  23 g butterfly     Labs drawn:  Comp Metabolic Panel , CBC with differential , TSH, and FSH/LH, Hep C Virus Antibody, HIV AG/AB Combo Assay     Ordering provider:  Brenda Ashford PA-C    Lab draw completed by:  [place]

## 2024-02-12 ENCOUNTER — OFFICE VISIT (OUTPATIENT)
Dept: SPORTS MEDICINE | Facility: CLINIC | Age: 24
End: 2024-02-12
Payer: COMMERCIAL

## 2024-02-12 VITALS
WEIGHT: 212 LBS | OXYGEN SATURATION: 96 % | DIASTOLIC BLOOD PRESSURE: 82 MMHG | SYSTOLIC BLOOD PRESSURE: 124 MMHG | TEMPERATURE: 98.5 F | HEIGHT: 64 IN | HEART RATE: 115 BPM | BODY MASS INDEX: 36.19 KG/M2 | RESPIRATION RATE: 18 BRPM

## 2024-02-12 DIAGNOSIS — G89.29 CHRONIC LEFT SHOULDER PAIN: ICD-10-CM

## 2024-02-12 DIAGNOSIS — M72.2 PLANTAR FASCIITIS, RIGHT: ICD-10-CM

## 2024-02-12 DIAGNOSIS — M25.512 CHRONIC LEFT SHOULDER PAIN: ICD-10-CM

## 2024-02-12 PROCEDURE — 3079F DIAST BP 80-89 MM HG: CPT | Performed by: FAMILY MEDICINE

## 2024-02-12 PROCEDURE — 99214 OFFICE O/P EST MOD 30 MIN: CPT | Performed by: FAMILY MEDICINE

## 2024-02-12 PROCEDURE — 3074F SYST BP LT 130 MM HG: CPT | Performed by: FAMILY MEDICINE

## 2024-02-12 ASSESSMENT — FIBROSIS 4 INDEX: FIB4 SCORE: 0.23

## 2024-02-12 NOTE — PROGRESS NOTES
"Chief Complaint   Patient presents with    Shoulder Pain     L shoulder pain     Foot Pain     R foot pain      CHIEF COMPLAINT:  Eva Yi female presenting at the request of EVITA Pickard  for evaluation of Shoulder pain.     Eva Yi is complaining of left shoulder pain (R handed)  present for 3 years  Pain is at the yan-scapular and superior shoulder  Quality is sharp  Pain is Non-radiating  Aggravated by  reaching up  Improved with  rest   Prior pain and injury in the remote past at work, closed in 2021  Prior Treatments:  Seen by PCP, recommended PT and did not follow up  Prior studies: X-Ray   Medications tried for pain include: none  Mechanical Symptom history: Popping when she does shoulder rolls which is not necessarily painful    RIGHT foot pain  Approximately 6 months (roughly July 2023)  Pain is predominantly along the plantar fascia region and the plantar arch with radiation into the RIGHT plantar heel  Pain is sharp and achy  No radiation  Feels better after taking her shoes off when she is done with her work shift  Worse with walking extended periods of time and first and steps  Saw [employer]  \"Plantar fasciitis dx at work\"    Works at [employer], on 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 No pertinent past medical history.    PMH:  has a past medical history of ADHD (attention deficit hyperactivity disorder), Allergy, ASTHMA, and Heart murmur.  MEDS:   Current Outpatient Medications:     methylPREDNISolone (MEDROL DOSEPAK) 4 MG Tablet Therapy Pack, Follow schedule on package instructions., Disp: 21 Tablet, Rfl: 0    promethazine-dextromethorphan (PROMETHAZINE-DM) 6.25-15 MG/5ML syrup, Take 5 mL by mouth every four hours as needed for Cough " "for up to 7 days., Disp: 120 mL, Rfl: 0    cefdinir (OMNICEF) 300 MG Cap, Take 1 Capsule by mouth 2 times a day for 7 days. (Patient not taking: Reported on 2/8/2024), Disp: 14 Capsule, Rfl: 0    Aug Betamethasone Dipropionate (DIPROLENE-AF) 0.05 % Cream, , Disp: , Rfl:     CLINDAMYCIN PHOSPHATE,TOPICAL, 1 % Lotion, , Disp: , Rfl:     betamethasone dipropionate (DIPROLENE) 0.05 % Ointment, Apply a small amount to the affected area twice a day no more than 10 to 14 days. (Patient not taking: Reported on 2/8/2024), Disp: 50 g, Rfl: 0    albuterol 108 (90 Base) MCG/ACT Aero Soln inhalation aerosol, Inhale 1-2 Puffs every 6 hours as needed for Shortness of Breath. (Patient not taking: Reported on 2/8/2024), Disp: 8.5 g, Rfl: 4  ALLERGIES:   Allergies   Allergen Reactions    Pcn [Penicillins] Hives     SURGHX: History reviewed. No pertinent surgical history.  SOCHX:  reports that she has never smoked. She has never used smokeless tobacco. She reports that she does not drink alcohol and does not use drugs.  FH: Family history was reviewed, no pertinent findings to report     PHYSICAL EXAM:  /82 (BP Location: Left arm, Patient Position: Sitting, BP Cuff Size: Large adult)   Pulse (!) 115   Temp 36.9 °C (98.5 °F) (Temporal)   Resp 18   Ht 1.626 m (5' 4\")   Wt 96.2 kg (212 lb)   LMP 01/29/2024   SpO2 96%   BMI 36.39 kg/m²      obese in no apparent distress, alert and oriented x 3.  Gait: normal    Cervical spine:  Range of motion Intact  Spurling's testing is POSITIVE with pain radiating into the shoulder and yan-scapular region on the LEFT  Cervical spine tenderness NEGATIVE    Strength testing:     Deltoid, bilateral 5/5  Bicep, bilateral 5/5  Tricep, bilateral 5/5  Wrist Extension, bilateral 5/5  Wrist Flexion, bilateral 5/5  Finger Abduction, bilateral 5/5    Sensation:  INTACT Bilaterally        Reflexes:   Biceps: R 2+/L 2+  Triceps: R 2+/L  2+  Brachial radialis R 2+/L  2+  Arthur's testing is " NEGATIVE  The arms are otherwise neurovascularly intact     Shoulder Exam:    RIGHT Shoulder:  No visible swelling   Range of motion INTACT  Tenderness: Non-tender  Empty Can Testing 5/5  Internal Rotation 5/5  External Rotation 5/5  Lift Off Testing 5/5  Impingement testing Hyman  NEGATIVE  Neer's testing NEGATIVE  Apprehension testing NEGATIVE    LEFT Shoulder:  No visible swelling   Range of motion INTACT  Tenderness: Non-tender  Empty Can Testing 5/5  Internal Rotation 5/5  External Rotation 5/5  Lift Off Testing 5/5  Impingement testing Hyman  NEGATIVE  Neer's testing NEGATIVE  Apprehension testing NEGATIVE    Additional Findings: None      1. Chronic left shoulder pain  Referral to Physical Therapy      2. Plantar fasciitis, right          left shoulder pain (R handed)  present for 3 years  Pain is at the yan-scapular and superior shoulder  Referral for formal physical therapy placed    RIGHT foot pain  Approximately 6 months (roughly July 2023)  Pain is predominantly along the plantar fascia region and the plantar arch with radiation into the RIGHT plantar heel  Demonstrated soleus and calf stretch  Provided with home exercise program  Recommended night splint      Return in about 6 weeks (around 3/25/2024).  To see how she is doing with LEFT shoulder exercises and night splint for her plantar fasciitis of the RIGHT side          10/20/2023 9:52 AM     HISTORY/REASON FOR EXAM:  Pain following trauma.     TECHNIQUE/EXAM DESCRIPTION AND NUMBER OF VIEWS:  3 views of the LEFT shoulder.     COMPARISON: None     FINDINGS:     BONE MINERALIZATION: Normal.  JOINTS: Preserved. No erosions.  FRACTURE: None.  DISLOCATION: None.  SOFT TISSUES: No mass.     IMPRESSION:     No fracture or dislocation.           Exam Ended: 10/20/23 10:14 AM Last Resulted: 10/20/23 10:27 AM           Interpreted in the office today with the patient    Thank you EVITA Pickard for allowing me to participate participate in care of  your patient.

## 2024-02-13 ENCOUNTER — OFFICE VISIT (OUTPATIENT)
Dept: MEDICAL GROUP | Facility: CLINIC | Age: 24
End: 2024-02-13
Payer: COMMERCIAL

## 2024-02-13 VITALS
RESPIRATION RATE: 20 BRPM | SYSTOLIC BLOOD PRESSURE: 124 MMHG | WEIGHT: 208.56 LBS | HEART RATE: 87 BPM | HEIGHT: 61 IN | OXYGEN SATURATION: 99 % | BODY MASS INDEX: 39.38 KG/M2 | DIASTOLIC BLOOD PRESSURE: 68 MMHG | TEMPERATURE: 97.7 F

## 2024-02-13 DIAGNOSIS — R19.4 FREQUENT BOWEL MOVEMENTS: ICD-10-CM

## 2024-02-13 DIAGNOSIS — K59.09 OTHER CONSTIPATION: ICD-10-CM

## 2024-02-13 DIAGNOSIS — N92.6 IRREGULAR PERIODS: ICD-10-CM

## 2024-02-13 DIAGNOSIS — E03.9 ACQUIRED HYPOTHYROIDISM: ICD-10-CM

## 2024-02-13 DIAGNOSIS — R74.8 ELEVATED ALKALINE PHOSPHATASE LEVEL: ICD-10-CM

## 2024-02-13 PROCEDURE — 3078F DIAST BP <80 MM HG: CPT | Performed by: PHYSICIAN ASSISTANT

## 2024-02-13 PROCEDURE — 99213 OFFICE O/P EST LOW 20 MIN: CPT | Performed by: PHYSICIAN ASSISTANT

## 2024-02-13 PROCEDURE — 3074F SYST BP LT 130 MM HG: CPT | Performed by: PHYSICIAN ASSISTANT

## 2024-02-13 RX ORDER — LEVOTHYROXINE SODIUM 0.05 MG/1
50 TABLET ORAL
Qty: 90 TABLET | Refills: 0 | Status: SHIPPED | OUTPATIENT
Start: 2024-02-13

## 2024-02-13 ASSESSMENT — FIBROSIS 4 INDEX: FIB4 SCORE: 0.23

## 2024-02-14 NOTE — PROGRESS NOTES
cc:  irregular periods    Subjective:     Eva Yi is a 23 y.o. female presenting for irregular periods      Patient presents to the office for irregular periods.  Patient had labs drawn and is here to follow up.  Labs show she has an elevated phosphatase level and that she is hypothyroid.  Patient also indicates that she was ill at the time of the labs.  She did have an elevated white blood cell count.  She is currently on steroids.    Patient has not started the dicyclomine for IBS she is wanting to know if she should start at this time.  She has also been having episodes of constipation along with diarrhea.    Review of systems:  See above.   Denies any symptoms unless previously indicated.        Current Outpatient Medications:     levothyroxine (SYNTHROID) 50 MCG Tab, Take 1 Tablet by mouth every morning on an empty stomach., Disp: 90 Tablet, Rfl: 0    methylPREDNISolone (MEDROL DOSEPAK) 4 MG Tablet Therapy Pack, Follow schedule on package instructions., Disp: 21 Tablet, Rfl: 0    promethazine-dextromethorphan (PROMETHAZINE-DM) 6.25-15 MG/5ML syrup, Take 5 mL by mouth every four hours as needed for Cough for up to 7 days., Disp: 120 mL, Rfl: 0    albuterol 108 (90 Base) MCG/ACT Aero Soln inhalation aerosol, Inhale 1-2 Puffs every 6 hours as needed for Shortness of Breath., Disp: 8.5 g, Rfl: 4    Aug Betamethasone Dipropionate (DIPROLENE-AF) 0.05 % Cream, , Disp: , Rfl:     CLINDAMYCIN PHOSPHATE,TOPICAL, 1 % Lotion, , Disp: , Rfl:     betamethasone dipropionate (DIPROLENE) 0.05 % Ointment, Apply a small amount to the affected area twice a day no more than 10 to 14 days. (Patient not taking: Reported on 2/8/2024), Disp: 50 g, Rfl: 0    Allergies, past medical history, past surgical history, family history, social history reviewed and updated    Objective:     Vitals: /68 (BP Location: Left arm, Patient Position: Sitting, BP Cuff Size: Adult)   Pulse 87   Temp 36.5 °C (97.7 °F) (Temporal)   Resp  "20   Ht 1.537 m (5' 0.5\")   Wt 94.6 kg (208 lb 8.9 oz)   LMP 01/27/2024   SpO2 99%   BMI 40.06 kg/m²   General: Alert, pleasant, NAD  EYES:   PERRL, EOMI, no icterus or pallor.  Conjunctivae and lids normal.   HENT:  Normocephalic.  External ears normal.   Neck supple.     Respiratory: Normal respiratory effort.   Abdomen: obese  Skin: Warm, dry, no rashes.  Musculoskeletal: Gait is normal.  Moves all extremities well.    Extremities: Normal range of motion all extremities.   Neurological: No tremors, sensation grossly intact,  gait is normal, CN2-12 intact.  Psych:  Affect/mood is normal, judgement is good, memory is intact, grooming is appropriate.     Latest Reference Range & Units 02/08/24 09:38   WBC 4.8 - 10.8 K/uL 13.1 (H)   RBC 4.20 - 5.40 M/uL 5.04   Hemoglobin 12.0 - 16.0 g/dL 14.3   Hematocrit 37.0 - 47.0 % 43.5   MCV 81.4 - 97.8 fL 86.3   MCH 27.0 - 33.0 pg 28.4   MCHC 32.2 - 35.5 g/dL 32.9   RDW 35.9 - 50.0 fL 38.1   Platelet Count 164 - 446 K/uL 358   MPV 9.0 - 12.9 fL 10.3   Neutrophils-Polys 44.00 - 72.00 % 69.40   Neutrophils (Absolute) 1.82 - 7.42 K/uL 9.06 (H)   Lymphocytes 22.00 - 41.00 % 22.40   Lymphs (Absolute) 1.00 - 4.80 K/uL 2.93   Monocytes 0.00 - 13.40 % 6.10   Monos (Absolute) 0.00 - 0.85 K/uL 0.80   Eosinophils 0.00 - 6.90 % 1.30   Eos (Absolute) 0.00 - 0.51 K/uL 0.17   Basophils 0.00 - 1.80 % 0.50   Baso (Absolute) 0.00 - 0.12 K/uL 0.07   Immature Granulocytes 0.00 - 0.90 % 0.30   Immature Granulocytes (abs) 0.00 - 0.11 K/uL 0.04   Nucleated RBC 0.00 - 0.20 /100 WBC 0.00   NRBC (Absolute) K/uL 0.00   Sodium 135 - 145 mmol/L 135   Potassium 3.6 - 5.5 mmol/L 4.0   Chloride 96 - 112 mmol/L 101   Co2 20 - 33 mmol/L 19 (L)   Anion Gap 7.0 - 16.0  15.0   Glucose 65 - 99 mg/dL 80   Bun 8 - 22 mg/dL 11   Creatinine 0.50 - 1.40 mg/dL 0.40 (L)   GFR (CKD-EPI) >60 mL/min/1.73 m 2 144   Calcium 8.5 - 10.5 mg/dL 9.7   Correct Calcium 8.5 - 10.5 mg/dL 9.5   AST(SGOT) 12 - 45 U/L 19   ALT(SGPT) " 2 - 50 U/L 24   Alkaline Phosphatase 30 - 99 U/L 106 (H)   Total Bilirubin 0.1 - 1.5 mg/dL 0.2   Albumin 3.2 - 4.9 g/dL 4.3   Total Protein 6.0 - 8.2 g/dL 7.9   Globulin 1.9 - 3.5 g/dL 3.6 (H)   A-G Ratio g/dL 1.2   TSH 0.380 - 5.330 uIU/mL 6.730 (H)   Free T-4 0.93 - 1.70 ng/dL 1.07   Hepatitis C Antibody Non-Reactive  Non-Reactive   HIV Ag/Ab Combo Assay Non Reactive  Non-Reactive   Follicle Stimulating Hormone mIU/mL 5.5   Luteinizing Hormone IU/L 6.1   (H): Data is abnormally high  (L): Data is abnormally low    Assessment/Plan:     Eva was seen today for lab results and follow-up.    Diagnoses and all orders for this visit:    Acquired hypothyroidism  -     TSH WITH REFLEX TO FT4; Future  -     levothyroxine (SYNTHROID) 50 MCG Tab; Take 1 Tablet by mouth every morning on an empty stomach.    Irregular periods    Elevated alkaline phosphatase level    Other constipation    Frequent bowel movements    I believe patient's symptoms are related to her thyroid.  We will start her on levothyroxine 50 mcg and repeat labs in approximately 6 to 8 weeks.  Continue to monitor labs.  Believe the irregular periods and irregular bowel issues may improve his patient is treated.  Will continue to monitor the alkaline phosphatase level.  Hypothyroid process and treatment discussed in great detail today.        Return in about 6 weeks (around 3/26/2024) for 6-8 weeks.    Please note that this dictation was created using voice recognition software. I have made every reasonable attempt to correct obvious errors, but expect that there are errors of grammar and possible content that I did not discover before finalizing note.

## 2024-03-18 ENCOUNTER — HOSPITAL ENCOUNTER (OUTPATIENT)
Dept: CARDIOLOGY | Facility: MEDICAL CENTER | Age: 24
End: 2024-03-18
Attending: PHYSICIAN ASSISTANT
Payer: COMMERCIAL

## 2024-03-18 DIAGNOSIS — R01.1 HEART MURMUR: ICD-10-CM

## 2024-03-18 LAB — LV EJECT FRACT  99904: 65

## 2024-03-18 PROCEDURE — 93306 TTE W/DOPPLER COMPLETE: CPT

## 2024-03-18 PROCEDURE — 93306 TTE W/DOPPLER COMPLETE: CPT | Mod: 26 | Performed by: INTERNAL MEDICINE

## 2024-03-27 ENCOUNTER — APPOINTMENT (OUTPATIENT)
Dept: MEDICAL GROUP | Facility: CLINIC | Age: 24
End: 2024-03-27
Payer: COMMERCIAL

## 2024-03-28 ENCOUNTER — HOSPITAL ENCOUNTER (OUTPATIENT)
Facility: MEDICAL CENTER | Age: 24
End: 2024-03-28
Attending: PHYSICIAN ASSISTANT
Payer: COMMERCIAL

## 2024-03-28 ENCOUNTER — NON-PROVIDER VISIT (OUTPATIENT)
Dept: MEDICAL GROUP | Facility: CLINIC | Age: 24
End: 2024-03-28
Payer: COMMERCIAL

## 2024-03-28 DIAGNOSIS — E03.9 ACQUIRED HYPOTHYROIDISM: ICD-10-CM

## 2024-03-28 LAB — TSH SERPL DL<=0.005 MIU/L-ACNC: 2.16 UIU/ML (ref 0.38–5.33)

## 2024-03-28 PROCEDURE — 84443 ASSAY THYROID STIM HORMONE: CPT

## 2024-03-28 NOTE — PROGRESS NOTES
Eva Yi is a 23 y.o. female here for a non-provider visit for a lab draw on 3/28/2024 at 8:10 AM.    Procedure performed:  Venipuncture     Anatomical site:  Left Antecubital Area    Equipment used:  21 g butterfly     Labs drawn:  TSH    Ordering provider:  Brenda Ashford P.A-C    Lab draw completed by:  Keith Ellis Ass't

## 2024-04-02 ENCOUNTER — OFFICE VISIT (OUTPATIENT)
Dept: MEDICAL GROUP | Facility: CLINIC | Age: 24
End: 2024-04-02
Payer: COMMERCIAL

## 2024-04-02 VITALS
OXYGEN SATURATION: 97 % | TEMPERATURE: 97.6 F | SYSTOLIC BLOOD PRESSURE: 136 MMHG | HEIGHT: 64 IN | HEART RATE: 83 BPM | BODY MASS INDEX: 36.4 KG/M2 | WEIGHT: 213.19 LBS | DIASTOLIC BLOOD PRESSURE: 62 MMHG | RESPIRATION RATE: 18 BRPM

## 2024-04-02 DIAGNOSIS — E03.9 ACQUIRED HYPOTHYROIDISM: ICD-10-CM

## 2024-04-02 DIAGNOSIS — Z78.9 USES BIRTH CONTROL: ICD-10-CM

## 2024-04-02 PROCEDURE — 3075F SYST BP GE 130 - 139MM HG: CPT | Performed by: PHYSICIAN ASSISTANT

## 2024-04-02 PROCEDURE — 99213 OFFICE O/P EST LOW 20 MIN: CPT | Performed by: PHYSICIAN ASSISTANT

## 2024-04-02 PROCEDURE — 3078F DIAST BP <80 MM HG: CPT | Performed by: PHYSICIAN ASSISTANT

## 2024-04-02 RX ORDER — LEVOTHYROXINE SODIUM 0.05 MG/1
50 TABLET ORAL
Qty: 90 TABLET | Refills: 3 | Status: SHIPPED | OUTPATIENT
Start: 2024-04-02

## 2024-04-02 RX ORDER — NORGESTIMATE AND ETHINYL ESTRADIOL 7DAYSX3 28
1 KIT ORAL DAILY
Qty: 90 TABLET | Refills: 3 | Status: SHIPPED | OUTPATIENT
Start: 2024-04-02

## 2024-04-02 ASSESSMENT — FIBROSIS 4 INDEX: FIB4 SCORE: 0.23

## 2024-04-02 NOTE — PROGRESS NOTES
"cc:  thyroid    Subjective:     Eva Yi is a 23 y.o. female presenting for thyroid      Patient presents to the office for thyroid.  Patient presents the office today to discuss her most recent test results for her thyroid.  Patient denies any symptoms at this time.    Patient indicates that she is currently menstruating.  She is wanting to know why her medication was \"canceled\" it appears that it was taken off her list when she was seen in the urgent care.    Review of systems:  See above.   Denies any symptoms unless previously indicated.        Current Outpatient Medications:     levothyroxine (SYNTHROID) 50 MCG Tab, Take 1 Tablet by mouth every morning on an empty stomach., Disp: 90 Tablet, Rfl: 3    Norgestim-Eth Estrad Triphasic (TRI-SPRINTEC) 0.18/0.215/0.25 MG-35 MCG Tab, Take 1 Tablet by mouth every day., Disp: 90 Tablet, Rfl: 3    albuterol 108 (90 Base) MCG/ACT Aero Soln inhalation aerosol, Inhale 1-2 Puffs every 6 hours as needed for Shortness of Breath., Disp: 8.5 g, Rfl: 4    methylPREDNISolone (MEDROL DOSEPAK) 4 MG Tablet Therapy Pack, Follow schedule on package instructions. (Patient not taking: Reported on 4/2/2024), Disp: 21 Tablet, Rfl: 0    Aug Betamethasone Dipropionate (DIPROLENE-AF) 0.05 % Cream, , Disp: , Rfl:     CLINDAMYCIN PHOSPHATE,TOPICAL, 1 % Lotion, , Disp: , Rfl:     betamethasone dipropionate (DIPROLENE) 0.05 % Ointment, Apply a small amount to the affected area twice a day no more than 10 to 14 days. (Patient not taking: Reported on 2/8/2024), Disp: 50 g, Rfl: 0    Allergies, past medical history, past surgical history, family history, social history reviewed and updated    Objective:     Vitals: /62 (BP Location: Left arm, Patient Position: Sitting, BP Cuff Size: Large adult)   Pulse 83   Temp 36.4 °C (97.6 °F) (Temporal)   Resp 18   Ht 1.626 m (5' 4\")   Wt 96.7 kg (213 lb 3 oz)   SpO2 97%   BMI 36.59 kg/m²   General: Alert, pleasant, NAD  EYES:   PERRL, " EOMI, no icterus or pallor.  Conjunctivae and lids normal.   HENT:  Normocephalic.  External ears normal.  Neck supple.     Respiratory: Normal respiratory effort.    Abdomen: obese  Skin: Warm, dry, no rashes.  Musculoskeletal: Gait is normal.  Moves all extremities well.    Extremities: Normal range of motion all extremities.   Neurological: No tremors, sensation grossly intact, CN2-12 intact.  Psych:  Affect/mood is normal, judgement is good, memory is intact, grooming is appropriate.     Latest Reference Range & Units 03/28/24 08:05   TSH 0.380 - 5.330 uIU/mL 2.160     Urine pregnancy: Negative    Assessment/Plan:     Autumn was seen today for follow-up.    Diagnoses and all orders for this visit:    Acquired hypothyroidism  -     levothyroxine (SYNTHROID) 50 MCG Tab; Take 1 Tablet by mouth every morning on an empty stomach.    Patient doing well with medication.  Will refill at this time.  Repeat labs in 6 months.  Patient to come in sooner with any concerns or symptoms potentially related to thyroid or any other issue.   Uses birth control  -     Norgestim-Eth Estrad Triphasic (TRI-SPRINTEC) 0.18/0.215/0.25 MG-35 MCG Tab; Take 1 Tablet by mouth every day.  -     POCT Pregnancy     Urine pregnancy test is negative.  Refill of medication at this time.  Patient can start now or wait until next month.        No follow-ups on file.    Please note that this dictation was created using voice recognition software. I have made every reasonable attempt to correct obvious errors, but expect that there are errors of grammar and possible content that I did not discover before finalizing note.

## 2024-04-19 DIAGNOSIS — J45.20 MILD INTERMITTENT ASTHMA WITHOUT COMPLICATION: ICD-10-CM

## 2024-04-19 RX ORDER — ALBUTEROL SULFATE 90 UG/1
1-2 AEROSOL, METERED RESPIRATORY (INHALATION) EVERY 6 HOURS PRN
Qty: 8.5 G | Refills: 0 | Status: SHIPPED | OUTPATIENT
Start: 2024-04-19

## 2024-04-19 NOTE — TELEPHONE ENCOUNTER
Requested Prescriptions     Pending Prescriptions Disp Refills    albuterol 108 (90 Base) MCG/ACT Aero Soln inhalation aerosol 8.5 g 0     Sig: Inhale 1-2 Puffs every 6 hours as needed for Shortness of Breath.     LOV 4/2/24  labs  02/08/2024

## 2024-05-12 DIAGNOSIS — E03.9 ACQUIRED HYPOTHYROIDISM: ICD-10-CM

## 2024-05-13 RX ORDER — LEVOTHYROXINE SODIUM 0.05 MG/1
50 TABLET ORAL
Qty: 90 TABLET | Refills: 3 | Status: SHIPPED | OUTPATIENT
Start: 2024-05-13

## 2024-05-13 NOTE — TELEPHONE ENCOUNTER
Requested Prescriptions     Pending Prescriptions Disp Refills    levothyroxine (SYNTHROID) 50 MCG Tab [Pharmacy Med Name: LEVOTHYROXINE 50 MCG TABLET] 30 Tablet 0     Sig: TAKE ONE TABLET BY MOUTH EVERY MORNING ON AN EMPTY STOMACH     LOV 04/02/2024  Labs 03/28/2024

## 2024-05-24 DIAGNOSIS — J45.20 MILD INTERMITTENT ASTHMA WITHOUT COMPLICATION: ICD-10-CM

## 2024-05-24 RX ORDER — ALBUTEROL SULFATE 90 UG/1
1-2 AEROSOL, METERED RESPIRATORY (INHALATION) EVERY 6 HOURS PRN
Qty: 18 G | Refills: 2 | Status: SHIPPED | OUTPATIENT
Start: 2024-05-24

## 2024-05-24 NOTE — TELEPHONE ENCOUNTER
Received request via: Patient    Was the patient seen in the last year in this department? Yes    Does the patient have an active prescription (recently filled or refills available) for medication(s) requested?  Yes    Pharmacy Name: [facility] in [place]    Does the patient have [facility] and need 100 day supply (blood pressure, diabetes and cholesterol meds only)? Patient does not have SCP

## 2025-01-09 ENCOUNTER — OFFICE VISIT (OUTPATIENT)
Dept: URGENT CARE | Facility: CLINIC | Age: 25
End: 2025-01-09
Payer: COMMERCIAL

## 2025-01-09 VITALS
OXYGEN SATURATION: 95 % | RESPIRATION RATE: 14 BRPM | TEMPERATURE: 97.7 F | DIASTOLIC BLOOD PRESSURE: 82 MMHG | SYSTOLIC BLOOD PRESSURE: 120 MMHG | HEIGHT: 64 IN | WEIGHT: 225.53 LBS | BODY MASS INDEX: 38.5 KG/M2 | HEART RATE: 83 BPM

## 2025-01-09 DIAGNOSIS — R19.7 DIARRHEA, UNSPECIFIED TYPE: ICD-10-CM

## 2025-01-09 DIAGNOSIS — R11.0 NAUSEA: ICD-10-CM

## 2025-01-09 LAB
APPEARANCE UR: CLEAR
BILIRUB UR STRIP-MCNC: NORMAL MG/DL
COLOR UR AUTO: YELLOW
GLUCOSE UR STRIP.AUTO-MCNC: NORMAL MG/DL
KETONES UR STRIP.AUTO-MCNC: NORMAL MG/DL
LEUKOCYTE ESTERASE UR QL STRIP.AUTO: NORMAL
NITRITE UR QL STRIP.AUTO: NORMAL
PH UR STRIP.AUTO: 7 [PH] (ref 5–8)
POCT INT CON NEG: NEGATIVE
POCT INT CON POS: POSITIVE
POCT URINE PREGNANCY TEST: NEGATIVE
PROT UR QL STRIP: NORMAL MG/DL
RBC UR QL AUTO: NORMAL
SP GR UR STRIP.AUTO: 1.01
UROBILINOGEN UR STRIP-MCNC: 0.2 MG/DL

## 2025-01-09 PROCEDURE — 81025 URINE PREGNANCY TEST: CPT | Performed by: PHYSICIAN ASSISTANT

## 2025-01-09 PROCEDURE — 99213 OFFICE O/P EST LOW 20 MIN: CPT | Performed by: PHYSICIAN ASSISTANT

## 2025-01-09 PROCEDURE — 3074F SYST BP LT 130 MM HG: CPT | Performed by: PHYSICIAN ASSISTANT

## 2025-01-09 PROCEDURE — 81002 URINALYSIS NONAUTO W/O SCOPE: CPT | Performed by: PHYSICIAN ASSISTANT

## 2025-01-09 PROCEDURE — 3079F DIAST BP 80-89 MM HG: CPT | Performed by: PHYSICIAN ASSISTANT

## 2025-01-09 RX ORDER — ONDANSETRON 4 MG/1
4 TABLET, ORALLY DISINTEGRATING ORAL EVERY 6 HOURS PRN
Qty: 15 TABLET | Refills: 0 | Status: SHIPPED | OUTPATIENT
Start: 2025-01-09

## 2025-01-09 RX ORDER — ONDANSETRON 4 MG/1
4 TABLET, ORALLY DISINTEGRATING ORAL ONCE
Status: COMPLETED | OUTPATIENT
Start: 2025-01-09 | End: 2025-01-09

## 2025-01-09 RX ADMIN — ONDANSETRON 4 MG: 4 TABLET, ORALLY DISINTEGRATING ORAL at 14:47

## 2025-01-09 ASSESSMENT — ENCOUNTER SYMPTOMS
BLOOD IN STOOL: 0
CONSTIPATION: 0
NAUSEA: 1
ABDOMINAL PAIN: 0
FEVER: 0
VOMITING: 0
ROS GI COMMENTS: 1
DIARRHEA: 1
CHILLS: 1

## 2025-01-09 ASSESSMENT — FIBROSIS 4 INDEX: FIB4 SCORE: 0.24

## 2025-01-09 NOTE — PROGRESS NOTES
"Subjective:   Eva Yi  is a 24 y.o. female who presents for GI Problem (Cramps, started last night ), Diarrhea (It also burns while going ), and Nausea      GI Problem  This is a new problem. Associated symptoms include chills and nausea. Pertinent negatives include no abdominal pain, fever or vomiting.   Diarrhea   Associated symptoms include chills. Pertinent negatives include no abdominal pain, fever or vomiting.   Nausea  Associated symptoms include chills and nausea. Pertinent negatives include no abdominal pain, fever or vomiting.   Patient presents urgent care describing onset of symptoms of nausea with some abdominal cramping and diarrhea last evening.  She denies vomiting.  She denies fever but has had some chills.  She notes some cramping to abdomen without significant pain.  She denies a history of abdominal surgeries.  Patient denies blood per stool.  She denies specific sick exposures.  She states she did eat out [employer] last night.  She notes multiple coworkers with general symptoms of illness but unclear what.  She denies dysuria frequency urgency or hematuria.  She just finished her menstrual period which was normal in timing around 4 days ago.  She requests work excuse note.    Review of Systems   Constitutional:  Positive for chills. Negative for fever.   Gastrointestinal:  Positive for diarrhea and nausea. Negative for abdominal pain, blood in stool, constipation and vomiting.        1   Genitourinary: Negative.        Allergies   Allergen Reactions    Pcn [Penicillins] Hives        Objective:   /82   Pulse 83   Temp 36.5 °C (97.7 °F) (Temporal)   Resp 14   Ht 1.626 m (5' 4\")   Wt 102 kg (225 lb 8.5 oz)   SpO2 95%   BMI 38.71 kg/m²     Physical Exam  Vitals and nursing note reviewed.   Constitutional:       General: She is not in acute distress.     Appearance: Normal appearance. She is well-developed. She is not diaphoretic.   HENT:      Head: Normocephalic and " atraumatic.      Right Ear: External ear normal.      Left Ear: External ear normal.      Nose: Nose normal.      Mouth/Throat:      Mouth: Mucous membranes are moist.      Pharynx: Uvula midline.   Eyes:      General: Lids are normal. No scleral icterus.        Right eye: No discharge.         Left eye: No discharge.      Conjunctiva/sclera: Conjunctivae normal.   Pulmonary:      Effort: Pulmonary effort is normal. No respiratory distress.   Abdominal:      General: Bowel sounds are increased. There is no distension.      Palpations: Abdomen is soft. Abdomen is not rigid.      Tenderness: There is generalized abdominal tenderness. There is no right CVA tenderness, left CVA tenderness, guarding or rebound. Negative signs include Ray's sign and McBurney's sign.      Comments: Nonacute abdomen, no focal tenderness to palpation, no guarding, no rebound   Musculoskeletal:         General: Normal range of motion.      Cervical back: Neck supple.   Lymphadenopathy:      Cervical: No cervical adenopathy.   Skin:     General: Skin is warm and dry.      Coloration: Skin is not pale.      Findings: No erythema.   Neurological:      Mental Status: She is alert and oriented to person, place, and time. She is not disoriented.   Psychiatric:         Speech: Speech normal.         Behavior: Behavior normal.     Zofran 4 mg ODT-tolerates well    Point-of-care hCG is negative  Point-of-care urinalysis is normal (see chart)    Assessment/Plan:   1. Nausea  - POCT Urinalysis  - POCT PREGNANCY  - ondansetron (Zofran ODT) dispertab 4 mg  - ondansetron (ZOFRAN ODT) 4 MG TABLET DISPERSIBLE; Take 1 Tablet by mouth every 6 hours as needed for Nausea/Vomiting for up to 15 doses.  Dispense: 15 Tablet; Refill: 0    2. Diarrhea, unspecified type  Supportive care is reviewed with patient/caregiver - recommend to push PO fluids and electrolytes, suspect self-limiting nature of abdominal symptoms of nausea cramping and diarrhea, encouraged to  continue pushing fluids, electrolyte beverages, given a dose of antiemetic and encouraged to use OTC Imodium if diarrhea persists, red flag symptoms reviewed.  Sent with work excuse note.  Return to clinic with lack of resolution or progression of symptoms.      I have worn an N95 mask, gloves and eye protection for the entire encounter with this patient.     Differential diagnosis, natural history, supportive care, and indications for immediate follow-up discussed.

## 2025-01-09 NOTE — LETTER
[address]  [facility] [address]     January 9, 2025    Patient: Eva Yi   YOB: 2000   Date of Visit: 1/9/2025       To Whom It May Concern:    Eva Yi was seen and treated in our department on 1/9/2025.  She should be excused from missed work for last night tonight and tomorrow night.    Sincerely,     Josef Elizondo P.A.-C.

## 2025-03-05 ENCOUNTER — OFFICE VISIT (OUTPATIENT)
Dept: URGENT CARE | Facility: PHYSICIAN GROUP | Age: 25
End: 2025-03-05
Payer: COMMERCIAL

## 2025-03-05 VITALS
HEIGHT: 64 IN | HEART RATE: 122 BPM | SYSTOLIC BLOOD PRESSURE: 118 MMHG | WEIGHT: 228.6 LBS | BODY MASS INDEX: 39.03 KG/M2 | RESPIRATION RATE: 20 BRPM | OXYGEN SATURATION: 96 % | DIASTOLIC BLOOD PRESSURE: 72 MMHG | TEMPERATURE: 97 F

## 2025-03-05 DIAGNOSIS — R68.89 FLU-LIKE SYMPTOMS: ICD-10-CM

## 2025-03-05 DIAGNOSIS — R51.9 ACUTE INTRACTABLE HEADACHE, UNSPECIFIED HEADACHE TYPE: ICD-10-CM

## 2025-03-05 PROCEDURE — 87651 STREP A DNA AMP PROBE: CPT | Performed by: NURSE PRACTITIONER

## 2025-03-05 PROCEDURE — 0241U POCT CEPHEID COV-2, FLU A/B, RSV - PCR: CPT | Performed by: NURSE PRACTITIONER

## 2025-03-05 PROCEDURE — 99213 OFFICE O/P EST LOW 20 MIN: CPT | Performed by: NURSE PRACTITIONER

## 2025-03-05 PROCEDURE — 3078F DIAST BP <80 MM HG: CPT | Performed by: NURSE PRACTITIONER

## 2025-03-05 PROCEDURE — 3074F SYST BP LT 130 MM HG: CPT | Performed by: NURSE PRACTITIONER

## 2025-03-05 RX ORDER — KETOROLAC TROMETHAMINE 15 MG/ML
15 INJECTION, SOLUTION INTRAMUSCULAR; INTRAVENOUS ONCE
Status: COMPLETED | OUTPATIENT
Start: 2025-03-05 | End: 2025-03-05

## 2025-03-05 RX ADMIN — KETOROLAC TROMETHAMINE 15 MG: 15 INJECTION, SOLUTION INTRAMUSCULAR; INTRAVENOUS at 13:15

## 2025-03-05 ASSESSMENT — ENCOUNTER SYMPTOMS
PHOTOPHOBIA: 0
DOUBLE VISION: 0
HEADACHES: 1
BLURRED VISION: 0
DIZZINESS: 0
COUGH: 1
SHORTNESS OF BREATH: 1

## 2025-03-05 ASSESSMENT — FIBROSIS 4 INDEX: FIB4 SCORE: 0.24

## 2025-03-05 NOTE — PROGRESS NOTES
Subjective:     Eva Yi is a 24 y.o. female who presents for Migraine (Dizziness ), Nasal Congestion (X 1 day ), Pharyngitis (X 1 day ), and Shortness of Breath (X 1 day )      Pharyngitis   Associated symptoms include coughing, headaches and shortness of breath. Pertinent negatives include no ear pain.   Shortness of Breath  Associated symptoms include headaches. Pertinent negatives include no ear pain.     Pt presents for evaluation of a new problem. Eva is a pleasant 24 year old female who presents to  today with compliants of a HA to her occipital region that started yesterday. She does suffer from Migraine headaches. She also endorses a scratchy throat, mild congestion and cough that started yesterday as well.     Review of Systems   Constitutional:  Positive for malaise/fatigue.   HENT:  Negative for ear pain.    Eyes:  Negative for blurred vision, double vision and photophobia.   Respiratory:  Positive for cough and shortness of breath.    Neurological:  Positive for headaches. Negative for dizziness.       PMH:   Past Medical History:   Diagnosis Date    ADHD (attention deficit hyperactivity disorder)     Allergy     ASTHMA     Heart murmur      ALLERGIES:   Allergies   Allergen Reactions    Pcn [Penicillins] Hives     SURGHX: No past surgical history on file.  SOCHX:   Social History     Socioeconomic History    Marital status: Single   Tobacco Use    Smoking status: Never    Smokeless tobacco: Never   Vaping Use    Vaping status: Never Used   Substance and Sexual Activity    Alcohol use: No     Alcohol/week: 0.0 oz    Drug use: No    Sexual activity: Never     Birth control/protection: Pill     FH:   Family History   Problem Relation Age of Onset    Alcohol/Drug Mother     Psychiatric Illness Mother     Other Mother     No Known Problems Father     Hypertension Paternal Grandmother          Objective:   /72   Pulse (!) 122   Temp 36.1 °C (97 °F) (Temporal)   Resp 20   Ht 1.626 m  "(5' 4\")   Wt 104 kg (228 lb 9.6 oz)   SpO2 96%   BMI 39.24 kg/m²     Physical Exam  Vitals and nursing note reviewed.   Constitutional:       General: She is not in acute distress.     Appearance: Normal appearance. She is ill-appearing.   HENT:      Head: Normocephalic and atraumatic.      Right Ear: External ear normal.      Left Ear: External ear normal.      Nose: Congestion present. No rhinorrhea.      Mouth/Throat:      Mouth: Mucous membranes are moist.      Pharynx: Posterior oropharyngeal erythema present. No oropharyngeal exudate.   Eyes:      Extraocular Movements: Extraocular movements intact.      Pupils: Pupils are equal, round, and reactive to light.   Cardiovascular:      Rate and Rhythm: Normal rate and regular rhythm.      Pulses: Normal pulses.      Heart sounds: Normal heart sounds.   Pulmonary:      Effort: Pulmonary effort is normal. No respiratory distress.      Breath sounds: Normal breath sounds. No stridor. No wheezing, rhonchi or rales.   Chest:      Chest wall: No tenderness.   Abdominal:      General: Abdomen is flat. Bowel sounds are normal.      Palpations: Abdomen is soft.      Tenderness: There is no abdominal tenderness. There is no right CVA tenderness or left CVA tenderness.   Musculoskeletal:         General: Normal range of motion.      Cervical back: Normal range of motion and neck supple.   Skin:     General: Skin is warm and dry.      Capillary Refill: Capillary refill takes less than 2 seconds.   Neurological:      General: No focal deficit present.      Mental Status: She is alert and oriented to person, place, and time. Mental status is at baseline.   Psychiatric:         Mood and Affect: Mood normal.         Behavior: Behavior normal.         Thought Content: Thought content normal.         Judgment: Judgment normal.       Results for orders placed or performed in visit on 03/05/25   POCT Cepheid Group A Strep - PCR    Collection Time: 03/05/25  1:28 PM   Result Value " Ref Range    POC Group A Strep, PCR Not Detected Not Detected, Invalid   POCT Cepheid CoV-2, Flu A/B, RSV - PCR    Collection Time: 03/05/25  1:28 PM   Result Value Ref Range    SARS-CoV-2 by PCR Negative Negative, Invalid    Influenza virus A RNA Negative Negative, Invalid    Influenza virus B, PCR Negative Negative, Invalid    RSV, PCR Negative Negative, Invalid       Assessment/Plan:   Assessment      1. Flu-like symptoms  POCT Cepheid Group A Strep - PCR    POCT Cepheid CoV-2, Flu A/B, RSV - PCR      2. Acute intractable headache, unspecified headache type  ketorolac (Toradol) 15 MG/ML injection 15 mg        Toradol injection given in clinic and pt tolerated this well. Pt was advised to refrain from additional NSAIDS for the next 12 hours following this injection. Acetaminophen may be used every 4 hours as needed for additional relief of pain. Pt educated not to exceed more than advised amount on bottle.   We discussed supportive measures including humidifier, warm salt water gargles, over-the-counter Cepacol throat lozenges, rest  and increased fluids. Pt was encouraged to seek treatment back in the ER or urgent care for worsening symptoms,  fever greater than 100.5, wheezes or shortness of breath.

## 2025-03-05 NOTE — LETTER
March 5, 2025    To Whom It May Concern:         This is confirmation that Eva Yi attended her scheduled appointment with SARAH Garcia on 3/05/25. Please excuse her absence through 3/8/2025. She may return sooner if better.          If you have any questions please do not hesitate to call me at the phone number listed below.    Sincerely,          JS Garcia.  [phone]

## 2025-03-30 DIAGNOSIS — J45.20 MILD INTERMITTENT ASTHMA WITHOUT COMPLICATION: ICD-10-CM

## 2025-03-30 DIAGNOSIS — Z78.9 USES BIRTH CONTROL: ICD-10-CM

## 2025-03-31 NOTE — TELEPHONE ENCOUNTER
Requested Prescriptions     Pending Prescriptions Disp Refills    Norgestim-Eth Estrad Triphasic (TRI-SPRINTEC) 0.18/0.215/0.25 MG-35 MCG Tab 90 Tablet 3     Sig: Take 1 Tablet by mouth every day.     Last office visit: 4/2/24  Last lab: 3/28/24

## 2025-03-31 NOTE — TELEPHONE ENCOUNTER
Requested Prescriptions     Pending Prescriptions Disp Refills    albuterol 108 (90 Base) MCG/ACT Aero Soln inhalation aerosol 18 g 2     Sig: Inhale 1-2 Puffs every 6 hours as needed for Shortness of Breath.      Last office visit: 4/2/24  Last lab: 3/28/24

## 2025-04-01 ENCOUNTER — APPOINTMENT (OUTPATIENT)
Dept: MEDICAL GROUP | Facility: CLINIC | Age: 25
End: 2025-04-01
Payer: COMMERCIAL

## 2025-04-01 ENCOUNTER — PATIENT MESSAGE (OUTPATIENT)
Dept: MEDICAL GROUP | Facility: CLINIC | Age: 25
End: 2025-04-01

## 2025-04-01 VITALS
TEMPERATURE: 97.9 F | WEIGHT: 227.07 LBS | HEIGHT: 64 IN | DIASTOLIC BLOOD PRESSURE: 74 MMHG | RESPIRATION RATE: 16 BRPM | HEART RATE: 83 BPM | BODY MASS INDEX: 38.77 KG/M2 | SYSTOLIC BLOOD PRESSURE: 118 MMHG | OXYGEN SATURATION: 97 %

## 2025-04-01 DIAGNOSIS — R19.4 FREQUENT BOWEL MOVEMENTS: ICD-10-CM

## 2025-04-01 DIAGNOSIS — J45.20 MILD INTERMITTENT ASTHMA WITHOUT COMPLICATION: ICD-10-CM

## 2025-04-01 DIAGNOSIS — G43.011 INTRACTABLE MIGRAINE WITHOUT AURA AND WITH STATUS MIGRAINOSUS: ICD-10-CM

## 2025-04-01 DIAGNOSIS — E03.9 ACQUIRED HYPOTHYROIDISM: ICD-10-CM

## 2025-04-01 DIAGNOSIS — J30.2 SEASONAL ALLERGIES: ICD-10-CM

## 2025-04-01 DIAGNOSIS — Z30.09 BIRTH CONTROL COUNSELING: ICD-10-CM

## 2025-04-01 DIAGNOSIS — R74.8 ELEVATED ALKALINE PHOSPHATASE LEVEL: ICD-10-CM

## 2025-04-01 DIAGNOSIS — K58.9 IRRITABLE BOWEL SYNDROME, UNSPECIFIED TYPE: ICD-10-CM

## 2025-04-01 DIAGNOSIS — Z13.21 ENCOUNTER FOR VITAMIN DEFICIENCY SCREENING: ICD-10-CM

## 2025-04-01 DIAGNOSIS — R35.0 FREQUENT URINATION: ICD-10-CM

## 2025-04-01 PROCEDURE — 3078F DIAST BP <80 MM HG: CPT | Performed by: PHYSICIAN ASSISTANT

## 2025-04-01 PROCEDURE — 3074F SYST BP LT 130 MM HG: CPT | Performed by: PHYSICIAN ASSISTANT

## 2025-04-01 PROCEDURE — 99214 OFFICE O/P EST MOD 30 MIN: CPT | Performed by: PHYSICIAN ASSISTANT

## 2025-04-01 RX ORDER — NORGESTIMATE AND ETHINYL ESTRADIOL 7DAYSX3 28
1 KIT ORAL DAILY
Qty: 90 TABLET | Refills: 3 | Status: SHIPPED | OUTPATIENT
Start: 2025-04-01

## 2025-04-01 RX ORDER — DICYCLOMINE HYDROCHLORIDE 10 MG/1
10 CAPSULE ORAL
Qty: 120 CAPSULE | Refills: 3 | Status: SHIPPED | OUTPATIENT
Start: 2025-04-01 | End: 2025-04-28 | Stop reason: SDUPTHER

## 2025-04-01 RX ORDER — MONTELUKAST SODIUM 10 MG/1
10 TABLET ORAL DAILY
Qty: 30 TABLET | Refills: 2 | Status: SHIPPED | OUTPATIENT
Start: 2025-04-01 | End: 2025-04-28 | Stop reason: SDUPTHER

## 2025-04-01 RX ORDER — ALBUTEROL SULFATE 90 UG/1
1-2 INHALANT RESPIRATORY (INHALATION) EVERY 6 HOURS PRN
Qty: 18 G | Refills: 3 | Status: SHIPPED | OUTPATIENT
Start: 2025-04-01

## 2025-04-01 ASSESSMENT — FIBROSIS 4 INDEX: FIB4 SCORE: 0.24

## 2025-04-01 NOTE — PROGRESS NOTES
cc:  ADA accommodation    Subjective:     Eva Yi is a 24 y.o. female presenting for ADA accommodation        History of Present Illness  The patient is a 24-year-old female who presents to the office today needing ADA accommodations for work.    She reports an increase in the frequency of her migraines, which she suspects may be related to her wisdom teeth. She experiences pain when pressure is applied to her jaw and occasionally feels her teeth pushing through the gum. She uses Orajel intermittently for relief. She has not previously taken medication for her migraines. She underwent an echocardiogram due to a heart murmur, which was normal. She experienced a severe migraine last week that lasted for 2 days, and another one yesterday. She currently has a lingering headache but has not taken any medication for it. She recalls a particularly severe migraine that occurred on a Thursday, causing her to miss work. The pain was located behind her eye and encompassed her entire head. Excedrin Migraine, Tylenol, and naproxen sodium provided minimal relief. She has been experiencing these symptoms for the past 1 to 2 years.    She has been using her albuterol inhaler more frequently than usual, approximately once or twice a week at work. She used to require it only during sports activities. She experiences shortness of breath when climbing stairs at work and occasionally needs to take deep breaths. She also reports experiencing tightness in her chest while watching TV or lying in bed, which necessitates the use of her inhaler. She is unsure if these symptoms are related to stress at work or another cause. She has allergies but has not noticed a worsening of her asthma during allergy season. She has not experienced breathing issues related to allergies in the past 6 months, except for nasal congestion. She recently adopted two kittens and suspects they may be contributing to her nasal congestion. She took Sudafed  yesterday, which significantly alleviated her nasal congestion and provided some relief from her migraine.    She is seeking a refill of her birth control prescription, which she has been unable to take since Sunday morning. She has not been sexually active since then. She just finished her period.    She is requesting ADA accommodations due to frequent restroom use, up to 5 times per night, and after meals. This issue has been ongoing for some time and was previously managed with dicyclomine, although she is uncertain of its efficacy. Her symptoms fluctuate, with some days being worse than others, and are exacerbated by stress. She is currently experiencing high levels of stress at work.    She is also requesting a refill of her albuterol inhaler as she has been needing it more often than she used to.    ALLERGIES  The patient has allergies.    MEDICATIONS  Current: albuterol inhaler, birth control, dicyclomine       Review of systems:  See above.   Denies any symptoms unless previously indicated.        Current Outpatient Medications:     albuterol 108 (90 Base) MCG/ACT Aero Soln inhalation aerosol, INHALE 1 TO 2 PUFFS BY MOUTH EVERY 6 HOURS AS NEEDED FOR SHORTNESS OF BREATH, Disp: 18 g, Rfl: 2    levothyroxine (SYNTHROID) 50 MCG Tab, TAKE ONE TABLET BY MOUTH EVERY MORNING ON AN EMPTY STOMACH, Disp: 90 Tablet, Rfl: 3    Norgestim-Eth Estrad Triphasic (TRI-SPRINTEC) 0.18/0.215/0.25 MG-35 MCG Tab, Take 1 Tablet by mouth every day., Disp: 90 Tablet, Rfl: 3    betamethasone dipropionate (DIPROLENE) 0.05 % Ointment, Apply a small amount to the affected area twice a day no more than 10 to 14 days., Disp: 50 g, Rfl: 0    ondansetron (ZOFRAN ODT) 4 MG TABLET DISPERSIBLE, Take 1 Tablet by mouth every 6 hours as needed for Nausea/Vomiting for up to 15 doses. (Patient not taking: Reported on 4/1/2025), Disp: 15 Tablet, Rfl: 0    methylPREDNISolone (MEDROL DOSEPAK) 4 MG Tablet Therapy Pack, Follow schedule on package  "instructions. (Patient not taking: Reported on 4/1/2025), Disp: 21 Tablet, Rfl: 0    Aug Betamethasone Dipropionate (DIPROLENE-AF) 0.05 % Cream, , Disp: , Rfl:     CLINDAMYCIN PHOSPHATE,TOPICAL, 1 % Lotion, , Disp: , Rfl:     Allergies, past medical history, past surgical history, family history, social history reviewed and updated    Objective:     Vitals: /74 (BP Location: Left arm, Patient Position: Sitting, BP Cuff Size: Adult)   Pulse 83   Temp 36.6 °C (97.9 °F) (Temporal)   Resp 16   Ht 1.626 m (5' 4\")   Wt 103 kg (227 lb 1.2 oz)   SpO2 97%   BMI 38.98 kg/m²   General: Alert, pleasant, NAD  EYES:   PERRL, EOMI, no icterus or pallor.  Conjunctivae and lids normal.   HENT:  Normocephalic.  External ears normal.  Neck supple.     Heart: Regular rate and rhythm.  S1 and S2 normal.  No murmurs appreciated.  Respiratory: Normal respiratory effort.  Clear to auscultation bilaterally.  Decreased breath sounds all lung fields.  Abdomen: obese  Skin: Warm, dry, no rashes.  Musculoskeletal: Gait is normal.  Moves all extremities well.    Extremities: normal range of motion all extremities.   Neurological: No tremors, sensation grossly intact, CN2-12 intact.  Psych:  Affect/mood is normal, judgement is good, memory is intact, grooming is appropriate.    Physical Exam  Lungs were auscultated.  Heart was examined.       Results  Imaging  Ultrasound was normal.       Assessment/Plan:     There are no diagnoses linked to this encounter.    Assessment & Plan  1. Hypothyroidism.  Laboratory tests have been ordered to further evaluate her condition.    2. Elevated alkaline phosphatase level.  Laboratory tests have been ordered to further evaluate her condition.    3. Screening for vitamin deficiency.  Laboratory tests have been ordered to further evaluate her condition.    4. Irritable bowel syndrome (IBS).  She reports frequent bowel movements and urination, which may be exacerbated by stress. A referral to " gastroenterology has been made for further evaluation. Dicyclomine will be prescribed to be taken before meals and at bedtime to help manage bowel movements.    5. Migraines.  She reports increasing frequency of migraines, potentially related to her wisdom teeth. She prefers to consult with a dentist first regarding the removal of her wisdom teeth. If there is no improvement post-extraction, further medication options will be considered.    6. Seasonal allergies.  She reports increased use of her albuterol inhaler, especially during work. Albuterol has been refilled. Singulair will be prescribed to be taken once daily, preferably at night, to manage her symptoms. Side effects, including the rare possibility of depression, have been discussed.    7. Intermittent asthma.  She reports increased use of her albuterol inhaler, especially during work. Albuterol has been refilled. Singulair will be prescribed to be taken once daily, preferably at night, to manage her symptoms. Side effects, including the rare possibility of depression, have been discussed.    8. Birth control management.  She is late with her birth control medication refills. Abstinence is recommended for the next 4 weeks. She will need to start her birth control on the first day of her next period. The prescription for her birth control has been sent to [facility] in [place].    9. Suspected urinary tract infection (UTI).  A urine test has been ordered to check for a urinary tract infection.    Follow-up  The patient will follow up in 4 weeks with test results.    No follow-ups on file.    Please note that this dictation was created using voice recognition software. I have made every reasonable attempt to correct obvious errors, but expect that there are errors of grammar and possible content that I did not discover before finalizing note.

## 2025-04-03 ENCOUNTER — HOSPITAL ENCOUNTER (OUTPATIENT)
Dept: LAB | Facility: MEDICAL CENTER | Age: 25
End: 2025-04-03
Attending: PHYSICIAN ASSISTANT
Payer: COMMERCIAL

## 2025-04-03 DIAGNOSIS — R35.0 FREQUENT URINATION: ICD-10-CM

## 2025-04-03 DIAGNOSIS — Z13.21 ENCOUNTER FOR VITAMIN DEFICIENCY SCREENING: ICD-10-CM

## 2025-04-03 DIAGNOSIS — R74.8 ELEVATED ALKALINE PHOSPHATASE LEVEL: ICD-10-CM

## 2025-04-03 DIAGNOSIS — E03.9 ACQUIRED HYPOTHYROIDISM: ICD-10-CM

## 2025-04-03 LAB
APPEARANCE UR: CLEAR
BILIRUB UR QL STRIP.AUTO: NEGATIVE
COLOR UR: YELLOW
GLUCOSE UR STRIP.AUTO-MCNC: NEGATIVE MG/DL
KETONES UR STRIP.AUTO-MCNC: NEGATIVE MG/DL
LEUKOCYTE ESTERASE UR QL STRIP.AUTO: NEGATIVE
MICRO URNS: NORMAL
NITRITE UR QL STRIP.AUTO: NEGATIVE
PH UR STRIP.AUTO: 5.5 [PH] (ref 5–8)
PROT UR QL STRIP: NEGATIVE MG/DL
RBC UR QL AUTO: NEGATIVE
SP GR UR STRIP.AUTO: 1.03
TSH SERPL DL<=0.005 MIU/L-ACNC: 0.08 UIU/ML (ref 0.38–5.33)
UROBILINOGEN UR STRIP.AUTO-MCNC: 1 EU/DL

## 2025-04-03 PROCEDURE — 84439 ASSAY OF FREE THYROXINE: CPT

## 2025-04-03 PROCEDURE — 36415 COLL VENOUS BLD VENIPUNCTURE: CPT

## 2025-04-03 PROCEDURE — 80053 COMPREHEN METABOLIC PANEL: CPT

## 2025-04-03 PROCEDURE — 82306 VITAMIN D 25 HYDROXY: CPT

## 2025-04-03 PROCEDURE — 80061 LIPID PANEL: CPT

## 2025-04-03 PROCEDURE — 81003 URINALYSIS AUTO W/O SCOPE: CPT

## 2025-04-03 PROCEDURE — 84443 ASSAY THYROID STIM HORMONE: CPT

## 2025-04-04 LAB
25(OH)D3 SERPL-MCNC: 16 NG/ML (ref 30–100)
ALBUMIN SERPL BCP-MCNC: 4.2 G/DL (ref 3.2–4.9)
ALBUMIN/GLOB SERPL: 1.2 G/DL
ALP SERPL-CCNC: 98 U/L (ref 30–99)
ALT SERPL-CCNC: 54 U/L (ref 2–50)
ANION GAP SERPL CALC-SCNC: 13 MMOL/L (ref 7–16)
AST SERPL-CCNC: 35 U/L (ref 12–45)
BILIRUB SERPL-MCNC: 0.3 MG/DL (ref 0.1–1.5)
BUN SERPL-MCNC: 17 MG/DL (ref 8–22)
CALCIUM ALBUM COR SERPL-MCNC: 9.4 MG/DL (ref 8.5–10.5)
CALCIUM SERPL-MCNC: 9.6 MG/DL (ref 8.5–10.5)
CHLORIDE SERPL-SCNC: 101 MMOL/L (ref 96–112)
CHOLEST SERPL-MCNC: 163 MG/DL (ref 100–199)
CO2 SERPL-SCNC: 21 MMOL/L (ref 20–33)
CREAT SERPL-MCNC: 0.62 MG/DL (ref 0.5–1.4)
FASTING STATUS PATIENT QL REPORTED: NORMAL
GFR SERPLBLD CREATININE-BSD FMLA CKD-EPI: 128 ML/MIN/1.73 M 2
GLOBULIN SER CALC-MCNC: 3.5 G/DL (ref 1.9–3.5)
GLUCOSE SERPL-MCNC: 90 MG/DL (ref 65–99)
HDLC SERPL-MCNC: 54 MG/DL
LDLC SERPL CALC-MCNC: 91 MG/DL
POTASSIUM SERPL-SCNC: 3.7 MMOL/L (ref 3.6–5.5)
PROT SERPL-MCNC: 7.7 G/DL (ref 6–8.2)
SODIUM SERPL-SCNC: 135 MMOL/L (ref 135–145)
T4 FREE SERPL-MCNC: 1.74 NG/DL (ref 0.93–1.7)
TRIGL SERPL-MCNC: 90 MG/DL (ref 0–149)

## 2025-04-04 NOTE — Clinical Note
REFERRAL APPROVAL NOTICE         Sent on April 4, 2025                   Eva Yi  [address]  [place] [address]                   Dear Ms. Yi,    After a careful review of the medical information and benefit coverage, [facility] has processed your referral. See below for additional details.    If applicable, you must be actively enrolled with your insurance for coverage of the authorized service. If you have any questions regarding your coverage, please contact your insurance directly.    REFERRAL INFORMATION   Referral #:  [id number]  Referred-To Provider    Referred-By Provider:  Gastroenterology    Brenda Ashford P.A.-C.   [facility]      [address]  [facility] [address]  [phone] [address]  [place] [address]  [phone]    Referral Start Date:  04/01/2025  Referral End Date:   04/01/2026             SCHEDULING  If you do not already have an appointment, please call [phone] to make an appointment.     MORE INFORMATION  If you do not already have a [facility] account, sign up at: [email or web address]  You can access your medical information, make appointments, see lab results, billing information, and more.  If you have questions regarding this referral, please contact  the [facility] Referrals department at:             [phone]. Monday - Friday 8:00AM - 5:00PM.     Sincerely,    [facility]

## 2025-04-07 ENCOUNTER — RESULTS FOLLOW-UP (OUTPATIENT)
Dept: MEDICAL GROUP | Facility: CLINIC | Age: 25
End: 2025-04-07
Payer: COMMERCIAL

## 2025-04-07 DIAGNOSIS — E03.9 ACQUIRED HYPOTHYROIDISM: ICD-10-CM

## 2025-04-26 ENCOUNTER — HOSPITAL ENCOUNTER (OUTPATIENT)
Dept: LAB | Facility: MEDICAL CENTER | Age: 25
End: 2025-04-26
Attending: PHYSICIAN ASSISTANT
Payer: COMMERCIAL

## 2025-04-26 DIAGNOSIS — E03.9 ACQUIRED HYPOTHYROIDISM: ICD-10-CM

## 2025-04-26 LAB — TSH SERPL DL<=0.005 MIU/L-ACNC: 2.27 UIU/ML (ref 0.38–5.33)

## 2025-04-26 PROCEDURE — 36415 COLL VENOUS BLD VENIPUNCTURE: CPT

## 2025-04-26 PROCEDURE — 84443 ASSAY THYROID STIM HORMONE: CPT

## 2025-04-28 ENCOUNTER — OFFICE VISIT (OUTPATIENT)
Dept: MEDICAL GROUP | Facility: CLINIC | Age: 25
End: 2025-04-28
Payer: COMMERCIAL

## 2025-04-28 VITALS
WEIGHT: 231.92 LBS | BODY MASS INDEX: 42.68 KG/M2 | RESPIRATION RATE: 16 BRPM | TEMPERATURE: 98 F | OXYGEN SATURATION: 96 % | SYSTOLIC BLOOD PRESSURE: 118 MMHG | HEART RATE: 59 BPM | HEIGHT: 62 IN | DIASTOLIC BLOOD PRESSURE: 72 MMHG

## 2025-04-28 DIAGNOSIS — E03.9 ACQUIRED HYPOTHYROIDISM: ICD-10-CM

## 2025-04-28 DIAGNOSIS — J45.20 MILD INTERMITTENT ASTHMA WITHOUT COMPLICATION: ICD-10-CM

## 2025-04-28 DIAGNOSIS — K58.9 IRRITABLE BOWEL SYNDROME, UNSPECIFIED TYPE: ICD-10-CM

## 2025-04-28 DIAGNOSIS — B07.8 OTHER VIRAL WARTS: ICD-10-CM

## 2025-04-28 DIAGNOSIS — J30.2 SEASONAL ALLERGIES: ICD-10-CM

## 2025-04-28 DIAGNOSIS — L30.9 ECZEMA, UNSPECIFIED TYPE: ICD-10-CM

## 2025-04-28 RX ORDER — FLUTICASONE PROPIONATE AND SALMETEROL 100; 50 UG/1; UG/1
1 POWDER RESPIRATORY (INHALATION) EVERY 12 HOURS
Qty: 3 EACH | Refills: 1 | Status: SHIPPED | OUTPATIENT
Start: 2025-04-28

## 2025-04-28 RX ORDER — MONTELUKAST SODIUM 10 MG/1
10 TABLET ORAL DAILY
Qty: 90 TABLET | Refills: 3 | Status: SHIPPED | OUTPATIENT
Start: 2025-04-28

## 2025-04-28 RX ORDER — DICYCLOMINE HYDROCHLORIDE 10 MG/1
10 CAPSULE ORAL
Qty: 360 CAPSULE | Refills: 2 | Status: SHIPPED | OUTPATIENT
Start: 2025-04-28

## 2025-04-28 ASSESSMENT — PATIENT HEALTH QUESTIONNAIRE - PHQ9: CLINICAL INTERPRETATION OF PHQ2 SCORE: 0

## 2025-04-28 ASSESSMENT — FIBROSIS 4 INDEX: FIB4 SCORE: 0.29

## 2025-04-28 NOTE — Clinical Note
REFERRAL APPROVAL NOTICE         Sent on April 28, 2025                   Eva Yi  [address]  [place] [address]                   Dear Ms. Yi,    After a careful review of the medical information and benefit coverage, [facility] has processed your referral. See below for additional details.    If applicable, you must be actively enrolled with your insurance for coverage of the authorized service. If you have any questions regarding your coverage, please contact your insurance directly.    REFERRAL INFORMATION   Referral #:  [id number]  Referred-To Provider    Referred-By Provider:  Dermatology    [facility]      [address] [address]  [address]  [facility] [address]  [phone] [address]  [facility] [address]  [phone]    Referral Start Date:  04/28/2025  Referral End Date:   04/28/2026             SCHEDULING  If you do not already have an appointment, please call [phone] to make an appointment.     MORE INFORMATION  If you do not already have a [employer] account, sign up at: [email or web address]  You can access your medical information, make appointments, see lab results, billing information, and more.  If you have questions regarding this referral, please contact  the [facility] Referrals department at:             [phone]. Monday - Friday 8:00AM - 5:00PM.     Sincerely,    [facility]

## 2025-04-28 NOTE — PROGRESS NOTES
cc:  HO    Subjective:     Eva Yi is a 24 y.o. female presenting for ADA        History of Present Illness  The patient is a 24-year-old female who presents to the office today to discuss disability, irritable bowel syndrome, acquired hypothyroidism, viral wart/eczema, and seasonal allergies/mild intermittent asthma.    Frequent restroom use is reported, which has been accommodated by her employer with two additional breaks, although clocking out is required for these. Her work schedule includes 15-minute breaks every two hours, allowing more frequent restroom use without special accommodations. Occasionally, clocking out is necessary due to coffee consumption. Information regarding a new referral to gastroenterology for further evaluation has not yet been received. Dicyclomine is found beneficial, but efficacy decreases when doses are missed. The current dosage is deemed appropriate as long as adherence to the regimen is maintained. Typically, the medication is taken post-lunch if the pre-lunch dose is forgotten. Due to work-related stress, medication intake has been inconsistent this week. The medication is taken twice daily, before and after work, with attempts to remember the post-lunch dose if the pre-lunch dose was missed. A 90-day supply is preferred for convenience, allowing some to be kept at work and reducing the likelihood of forgetting doses.    No recent episodes of nausea are reported. Initial lab results may have been affected by missed doses during an unusual week. Consistent medication intake has since been ensured, contributing to improved lab results.    A new dermatology referral is inquired about. Recurrence of some warts and development of a rash on the neck, which is frequently scratched, are reported. Redness on the neck was observed by her boyfriend the previous night. Uncertainty exists regarding whether the rash is related to her hairstyle at work, involving tying her hair up,  or if it is a manifestation of psoriasis or eczema due to stress. Betamethasone has not yet been applied to the rash.    Singulair has been effective in managing symptoms, particularly around her cats. Occasionally, mild chest restriction is experienced, attributed to not bathing the cats. Increased use of albuterol over the past few days is reported, possibly due to the growth of plants around her apartment. Advair has not been previously used.    She took her birth control on Tuesday.       Review of systems:  See above.   Denies any symptoms unless previously indicated.        Current Outpatient Medications:     dicyclomine (BENTYL) 10 MG Cap, Take 1 Capsule by mouth 4 Times a Day,Before Meals and at Bedtime., Disp: 360 Capsule, Rfl: 2    montelukast (SINGULAIR) 10 MG Tab, Take 1 Tablet by mouth every day., Disp: 90 Tablet, Rfl: 3    fluticasone-salmeterol (ADVAIR) 100-50 MCG/ACT AEROSOL POWDER, BREATH ACTIVATED, Inhale 1 Puff every 12 hours., Disp: 3 Each, Rfl: 1    Norgestim-Eth Estrad Triphasic (TRI-SPRINTEC) 0.18/0.215/0.25 MG-35 MCG Tab, Take 1 Tablet by mouth every day., Disp: 90 Tablet, Rfl: 3    albuterol 108 (90 Base) MCG/ACT Aero Soln inhalation aerosol, Inhale 1-2 Puffs every 6 hours as needed for Shortness of Breath., Disp: 18 g, Rfl: 3    levothyroxine (SYNTHROID) 50 MCG Tab, TAKE ONE TABLET BY MOUTH EVERY MORNING ON AN EMPTY STOMACH, Disp: 90 Tablet, Rfl: 3    betamethasone dipropionate (DIPROLENE) 0.05 % Ointment, Apply a small amount to the affected area twice a day no more than 10 to 14 days., Disp: 50 g, Rfl: 0    ondansetron (ZOFRAN ODT) 4 MG TABLET DISPERSIBLE, Take 1 Tablet by mouth every 6 hours as needed for Nausea/Vomiting for up to 15 doses. (Patient not taking: Reported on 3/5/2025), Disp: 15 Tablet, Rfl: 0    methylPREDNISolone (MEDROL DOSEPAK) 4 MG Tablet Therapy Pack, Follow schedule on package instructions. (Patient not taking: Reported on 4/2/2024), Disp: 21 Tablet, Rfl: 0    Aug  "Betamethasone Dipropionate (DIPROLENE-AF) 0.05 % Cream, , Disp: , Rfl:     CLINDAMYCIN PHOSPHATE,TOPICAL, 1 % Lotion, , Disp: , Rfl:     Allergies, past medical history, past surgical history, family history, social history reviewed and updated    Objective:     Vitals: /72 (BP Location: Left arm, Patient Position: Sitting, BP Cuff Size: Adult)   Pulse (!) 59   Temp 36.7 °C (98 °F) (Temporal)   Resp 16   Ht 1.575 m (5' 2\")   Wt 105 kg (231 lb 14.8 oz)   SpO2 96%   BMI 42.42 kg/m²   General: Alert, pleasant, NAD  EYES:   PERRL, EOMI, no icterus or pallor.  Conjunctivae and lids normal.   HENT:  Normocephalic.  External ears normal.  Neck supple.     Heart: Regular rate and rhythm.  S1 and S2 normal.  No murmurs appreciated.  Respiratory: Normal respiratory effort.  Clear to auscultation bilaterally. Decreased breath sounds with expiration.  Abdomen: obese  Skin: Warm, dry, dermatitis posterior neck at hair line.  Musculoskeletal: Gait is normal.  Moves all extremities well.    Extremities: normal range of motion all extremities.   Neurological: No tremors, sensation grossly intact,  CN2-12 intact.  Psych:  Affect/mood is normal, judgement is good, memory is intact, grooming is appropriate.    Physical Exam  Neck: Rash noted on the neck  Respiratory: Breath sounds good on inhalation, decreased on exhalation       Results  Labs   - Thyroid levels: Normal       Assessment/Plan:     Autumn was seen today for other and lab results.    Diagnoses and all orders for this visit:    Irritable bowel syndrome, unspecified type  -     dicyclomine (BENTYL) 10 MG Cap; Take 1 Capsule by mouth 4 Times a Day,Before Meals and at Bedtime.    Acquired hypothyroidism  -     TSH WITH REFLEX TO FT4; Future    Other viral warts  -     Referral to Dermatology    Eczema, unspecified type  -     Referral to Dermatology    Seasonal allergies  -     montelukast (SINGULAIR) 10 MG Tab; Take 1 Tablet by mouth every day.  -     " fluticasone-salmeterol (ADVAIR) 100-50 MCG/ACT AEROSOL POWDER, BREATH ACTIVATED; Inhale 1 Puff every 12 hours.    Mild intermittent asthma without complication  -     montelukast (SINGULAIR) 10 MG Tab; Take 1 Tablet by mouth every day.        Assessment & Plan  1. Irritable Bowel Syndrome.  - Her condition has shown improvement with the administration of dicyclomine.  - She will continue with the current medication regimen.  - A 90-day supply of dicyclomine will be provided to ensure she has enough at work and home.  - A new referral to gastroenterology has been approved, and she will call to schedule an appointment, which may be 4-6 months out.    2. Acquired Hypothyroidism.  - Laboratory results indicate that the current medication dosage is effective.  - She will continue to monitor her condition.  - Repeat laboratory tests will be conducted in 6 months to ensure the dose remains appropriate.  - No fasting is required for these tests.    3. Viral Wart/Eczema.  - A new referral to dermatology will be initiated.  - She has betamethasone and will apply it to the affected area on her neck.  - If there is no improvement, she will inform us.  - A steroid solution may be considered as the rash is located in the hairline.    4. Seasonal Allergies/Mild Intermittent Asthma.  - Her symptoms are currently uncontrolled.  - A follow-up appointment is scheduled for 3 months for reevaluation.  - She will start using Advair 100/50 mcg, one puff twice a day.  - A 3-month supply will be provided. If the Advair is not covered by her insurance or requires authorization, she will notify us. If the current dose is not effective, the dosage may be adjusted.    Follow-up  The patient will follow up in 3 months for asthma and in 6 months for thyroid.    Return in about 3 months (around 7/28/2025), or if symptoms worsen or fail to improve.    Please note that this dictation was created using voice recognition software. I have made every  reasonable attempt to correct obvious errors, but expect that there are errors of grammar and possible content that I did not discover before finalizing note.

## 2025-07-29 ENCOUNTER — HOSPITAL ENCOUNTER (OUTPATIENT)
Dept: LAB | Facility: MEDICAL CENTER | Age: 25
End: 2025-07-29
Attending: PHYSICIAN ASSISTANT
Payer: COMMERCIAL

## 2025-07-29 ENCOUNTER — OFFICE VISIT (OUTPATIENT)
Dept: MEDICAL GROUP | Facility: CLINIC | Age: 25
End: 2025-07-29
Payer: COMMERCIAL

## 2025-07-29 VITALS
WEIGHT: 227.29 LBS | HEIGHT: 63 IN | HEART RATE: 87 BPM | OXYGEN SATURATION: 94 % | SYSTOLIC BLOOD PRESSURE: 114 MMHG | TEMPERATURE: 97.8 F | RESPIRATION RATE: 16 BRPM | DIASTOLIC BLOOD PRESSURE: 80 MMHG | BODY MASS INDEX: 40.27 KG/M2

## 2025-07-29 DIAGNOSIS — G43.011 INTRACTABLE MIGRAINE WITHOUT AURA AND WITH STATUS MIGRAINOSUS: ICD-10-CM

## 2025-07-29 DIAGNOSIS — E03.9 ACQUIRED HYPOTHYROIDISM: ICD-10-CM

## 2025-07-29 DIAGNOSIS — L21.9 SEBORRHEIC DERMATITIS: ICD-10-CM

## 2025-07-29 DIAGNOSIS — N92.6 IRREGULAR PERIODS: Primary | ICD-10-CM

## 2025-07-29 DIAGNOSIS — R00.2 PALPITATIONS: ICD-10-CM

## 2025-07-29 LAB
T4 FREE SERPL-MCNC: 0.94 NG/DL (ref 0.93–1.7)
TSH SERPL DL<=0.005 MIU/L-ACNC: 7.05 UIU/ML (ref 0.38–5.33)

## 2025-07-29 PROCEDURE — 84439 ASSAY OF FREE THYROXINE: CPT

## 2025-07-29 PROCEDURE — 36415 COLL VENOUS BLD VENIPUNCTURE: CPT

## 2025-07-29 PROCEDURE — 84443 ASSAY THYROID STIM HORMONE: CPT

## 2025-07-29 RX ORDER — CLOBETASOL PROPIONATE 0.05 G/100ML
SHAMPOO TOPICAL
COMMUNITY

## 2025-07-29 ASSESSMENT — FIBROSIS 4 INDEX: FIB4 SCORE: 0.31

## 2025-07-29 NOTE — PROGRESS NOTES
cc:  thyroid    Subjective:     Eva Yi is a 25 y.o. female presenting for thyroid        History of Present Illness  The patient presents to the office today to follow up on her thyroid condition. Labs were drawn this morning.    Two weeks ago, she experienced fuzzy vision in her left eye and dizziness after returning from lunch at work. She sought medical attention at her workplace clinic where her blood pressure was normal, blood sugar was 142, and heart rate was 130. She was advised to visit urgent care or the ER if these symptoms recurred, but they did not. She has been monitoring her heart rate, which tends to rise to 120-130 when she is active at work. Her resting heart rate is around 87, with a range of 45 to 125. She has been off work since Saturday morning and has not experienced any symptoms over the weekend. She reports no chest pain or shortness of breath today. During her dizzy spell, she also had a severe migraine and pain in the back of her head. After resting in a dark room for 15-20 minutes, her heart rate remained at 130 and her headache worsened upon sitting up. She plans to record her heart rate during different activities at work. Her mother checked her blood pressure last night, which was 120/78, and her blood sugar was 112. She does not have a way to measure her blood pressure at home, but it has not been an issue for her so far.    She has been experiencing breakouts behind her ears and on her scalp, which she attributes to wearing safety glasses, masks, hair nets, and bump caps at work. She has been using clobetasol 0.05% shampoo, which she leaves on for 5 minutes before rinsing out. It occasionally causes itching, but she tries not to scratch it. She believes the sweat on her scalp exacerbates the condition.    She had an irregular period recently, with spotty discharge for a week followed by a week-long period. This was unusual for her as she is on birth control and does not  "use any other protection. She did not take a pregnancy test. She continues to take her birth control pills.    She still gets migraines and has not called the dentist yet because she cannot remember his name. She needs to find a dentist that takes her insurance because she cannot afford to get four wisdom teeth removed without insurance.    GYNECOLOGICAL HISTORY:  Frequency and Flow: Irregular period with spotty discharge for a week followed by a week-long period    PAST SURGICAL HISTORY:  Removal of warts through chiseling and cauterization by dermatology  Coden teeth removal planned       Review of systems:  See above.   Denies any symptoms unless previously indicated.      Current Medications[1]    Allergies, past medical history, past surgical history, family history, social history reviewed and updated    Objective:     Vitals: /80 (BP Location: Left arm, Patient Position: Sitting, BP Cuff Size: Large adult long)   Pulse 87   Temp 36.6 °C (97.8 °F) (Temporal)   Resp 16   Ht 1.6 m (5' 3\")   Wt 103 kg (227 lb 4.7 oz)   SpO2 94%   BMI 40.26 kg/m²   General: Alert, pleasant, NAD  EYES:   PERRL, EOMI, no icterus or pallor.  Conjunctivae and lids normal.   HENT:  Normocephalic.  Neck supple.     Respiratory: Normal respiratory effort.    Abdomen: obese  Skin: Warm, dry, no rashes.  Musculoskeletal: Gait is normal.  Moves all extremities well.    Neurological: No tremors, sensation grossly intact, CN2-12 intact.  Psych:  Affect/mood is normal, judgement is good, memory is intact, grooming is appropriate.    Physical Exam  General: No acute distress.  Neurological: Alert and oriented.  Respiratory: No shortness of breath.  Cardiovascular: Heart rate elevated at 130 bpm during dizziness episode.  Skin: Seborrheic dermatitis noted on scalp.       Results  Labs   - Blood Glucose: 142 mg/dL   - Blood Glucose: 112 mg/dL    Diagnostic Testing   - Heart Rate: 130 bpm   - Heart Rate: 92 bpm   - Blood Pressure: " 120/78       Assessment/Plan:     Eva was seen today for hypothyroidism.    Diagnoses and all orders for this visit:    Irregular periods  -     HCG QUANTITATIVE; Future    Acquired hypothyroidism  -     Comp Metabolic Panel; Future    Palpitations  -     Comp Metabolic Panel; Future  -     CBC WITH DIFFERENTIAL; Future    Intractable migraine without aura and with status migrainosus    Seborrheic dermatitis        Assessment & Plan  1. Hyperthyroidism.  - Symptoms may be related to an uncontrolled thyroid condition.  - Thyroid labs were drawn this morning, and results are pending. CBC and CMP will be obtained.  - Discussed the possibility of symptoms being related to thyroid dysfunction.  - Follow-up scheduled to review thyroid lab results and make necessary adjustments.    2. Palpitations.  - Reports elevated heart rates, particularly during work activities.  - EKG was recommended but declined due to time constraints. Consideration for a 72-hour heart monitor if symptoms persist.  - Advised to record heart rate during symptomatic episodes.  - Follow-up with EKG in 1 week.    3. Migraines.  - Experiences migraines associated with dizziness and elevated heart rate.  - Further evaluation will be conducted based on thyroid function tests and other labs.  - Discussed the potential link between migraines and thyroid dysfunction.  - Monitoring symptoms and follow-up as needed.    4. Irregular periods.  - Reports a recent irregular period with spotty discharge and prolonged bleeding.  - This could be related to her thyroid condition.  - Beta hCG test will be obtained to rule out pregnancy.  - Monitoring menstrual irregularities and follow-up as needed.    5. Seborrheic dermatitis.  - Experiencing breakouts behind ears and on scalp, likely exacerbated by wearing safety gear at work.  - Prescribed clobetasol 0.05% topical drops and shampoo by dermatology.  - Follow-up with dermatology regarding medication side  effects.  - Monitoring skin condition and adjusting treatment as necessary.        Follow-up: The patient will follow up in 1 week.    Return in about 1 week (around 8/5/2025) for ekg with visit in 1 week.  .    Please note that this dictation was created using voice recognition software. I have made every reasonable attempt to correct obvious errors, but expect that there are errors of grammar and possible content that I did not discover before finalizing note.               [1]   Current Outpatient Medications:     Clobetasol Propionate 0.05 % Shampoo, Apply  topically., Disp: , Rfl:     betamethasone dipropionate (DEL-BETA) 0.05 % Ointment, Apply a small amount to the affected area twice a day no more than 10 to 14 days., Disp: 50 g, Rfl: 0    levothyroxine (SYNTHROID) 50 MCG Tab, Take 1 Tablet by mouth every morning on an empty stomach., Disp: 90 Tablet, Rfl: 3    dicyclomine (BENTYL) 10 MG Cap, Take 1 Capsule by mouth 4 Times a Day,Before Meals and at Bedtime., Disp: 360 Capsule, Rfl: 2    montelukast (SINGULAIR) 10 MG Tab, Take 1 Tablet by mouth every day., Disp: 90 Tablet, Rfl: 3    fluticasone-salmeterol (ADVAIR) 100-50 MCG/ACT AEROSOL POWDER, BREATH ACTIVATED, Inhale 1 Puff every 12 hours., Disp: 3 Each, Rfl: 1    Norgestim-Eth Estrad Triphasic (TRI-SPRINTEC) 0.18/0.215/0.25 MG-35 MCG Tab, Take 1 Tablet by mouth every day., Disp: 90 Tablet, Rfl: 3    albuterol 108 (90 Base) MCG/ACT Aero Soln inhalation aerosol, Inhale 1-2 Puffs every 6 hours as needed for Shortness of Breath., Disp: 18 g, Rfl: 3    ondansetron (ZOFRAN ODT) 4 MG TABLET DISPERSIBLE, Take 1 Tablet by mouth every 6 hours as needed for Nausea/Vomiting for up to 15 doses. (Patient not taking: Reported on 7/29/2025), Disp: 15 Tablet, Rfl: 0    methylPREDNISolone (MEDROL DOSEPAK) 4 MG Tablet Therapy Pack, Follow schedule on package instructions. (Patient not taking: Reported on 7/29/2025), Disp: 21 Tablet, Rfl: 0    Aug Betamethasone Dipropionate  (DIPROLENE-AF) 0.05 % Cream, , Disp: , Rfl:     CLINDAMYCIN PHOSPHATE,TOPICAL, 1 % Lotion, , Disp: , Rfl:

## 2025-07-30 ENCOUNTER — HOSPITAL ENCOUNTER (OUTPATIENT)
Dept: LAB | Facility: MEDICAL CENTER | Age: 25
End: 2025-07-30
Attending: PHYSICIAN ASSISTANT
Payer: COMMERCIAL

## 2025-07-30 DIAGNOSIS — E03.9 ACQUIRED HYPOTHYROIDISM: Primary | ICD-10-CM

## 2025-07-30 DIAGNOSIS — N92.6 IRREGULAR PERIODS: ICD-10-CM

## 2025-07-30 DIAGNOSIS — R00.2 PALPITATIONS: ICD-10-CM

## 2025-07-30 DIAGNOSIS — E03.9 ACQUIRED HYPOTHYROIDISM: ICD-10-CM

## 2025-07-30 LAB
ALBUMIN SERPL BCP-MCNC: 4 G/DL (ref 3.2–4.9)
ALBUMIN/GLOB SERPL: 1.2 G/DL
ALP SERPL-CCNC: 91 U/L (ref 30–99)
ALT SERPL-CCNC: 35 U/L (ref 2–50)
ANION GAP SERPL CALC-SCNC: 12 MMOL/L (ref 7–16)
AST SERPL-CCNC: 28 U/L (ref 12–45)
B-HCG SERPL-ACNC: <1 MIU/ML (ref 0–5)
BASOPHILS # BLD AUTO: 0.6 % (ref 0–1.8)
BASOPHILS # BLD: 0.06 K/UL (ref 0–0.12)
BILIRUB SERPL-MCNC: 0.3 MG/DL (ref 0.1–1.5)
BUN SERPL-MCNC: 11 MG/DL (ref 8–22)
CALCIUM ALBUM COR SERPL-MCNC: 9.4 MG/DL (ref 8.5–10.5)
CALCIUM SERPL-MCNC: 9.4 MG/DL (ref 8.5–10.5)
CHLORIDE SERPL-SCNC: 103 MMOL/L (ref 96–112)
CO2 SERPL-SCNC: 21 MMOL/L (ref 20–33)
CREAT SERPL-MCNC: 0.6 MG/DL (ref 0.5–1.4)
EOSINOPHIL # BLD AUTO: 0.64 K/UL (ref 0–0.51)
EOSINOPHIL NFR BLD: 5.9 % (ref 0–6.9)
ERYTHROCYTE [DISTWIDTH] IN BLOOD BY AUTOMATED COUNT: 40.4 FL (ref 35.9–50)
FASTING STATUS PATIENT QL REPORTED: NORMAL
GFR SERPLBLD CREATININE-BSD FMLA CKD-EPI: 129 ML/MIN/1.73 M 2
GLOBULIN SER CALC-MCNC: 3.3 G/DL (ref 1.9–3.5)
GLUCOSE SERPL-MCNC: 83 MG/DL (ref 65–99)
HCT VFR BLD AUTO: 41.9 % (ref 37–47)
HGB BLD-MCNC: 13.5 G/DL (ref 12–16)
IMM GRANULOCYTES # BLD AUTO: 0.05 K/UL (ref 0–0.11)
IMM GRANULOCYTES NFR BLD AUTO: 0.5 % (ref 0–0.9)
LYMPHOCYTES # BLD AUTO: 2.64 K/UL (ref 1–4.8)
LYMPHOCYTES NFR BLD: 24.4 % (ref 22–41)
MCH RBC QN AUTO: 28.8 PG (ref 27–33)
MCHC RBC AUTO-ENTMCNC: 32.2 G/DL (ref 32.2–35.5)
MCV RBC AUTO: 89.3 FL (ref 81.4–97.8)
MONOCYTES # BLD AUTO: 0.71 K/UL (ref 0–0.85)
MONOCYTES NFR BLD AUTO: 6.6 % (ref 0–13.4)
NEUTROPHILS # BLD AUTO: 6.7 K/UL (ref 1.82–7.42)
NEUTROPHILS NFR BLD: 62 % (ref 44–72)
NRBC # BLD AUTO: 0 K/UL
NRBC BLD-RTO: 0 /100 WBC (ref 0–0.2)
PLATELET # BLD AUTO: 369 K/UL (ref 164–446)
PMV BLD AUTO: 10.3 FL (ref 9–12.9)
POTASSIUM SERPL-SCNC: 4.7 MMOL/L (ref 3.6–5.5)
PROT SERPL-MCNC: 7.3 G/DL (ref 6–8.2)
RBC # BLD AUTO: 4.69 M/UL (ref 4.2–5.4)
SODIUM SERPL-SCNC: 136 MMOL/L (ref 135–145)
WBC # BLD AUTO: 10.8 K/UL (ref 4.8–10.8)

## 2025-07-30 PROCEDURE — 80053 COMPREHEN METABOLIC PANEL: CPT

## 2025-07-30 PROCEDURE — 84702 CHORIONIC GONADOTROPIN TEST: CPT

## 2025-07-30 PROCEDURE — 36415 COLL VENOUS BLD VENIPUNCTURE: CPT

## 2025-07-30 PROCEDURE — 85025 COMPLETE CBC W/AUTO DIFF WBC: CPT

## 2025-07-30 RX ORDER — LEVOTHYROXINE SODIUM 75 UG/1
75 TABLET ORAL
Qty: 90 TABLET | Refills: 0 | Status: SHIPPED | OUTPATIENT
Start: 2025-07-30

## 2025-08-04 ENCOUNTER — OFFICE VISIT (OUTPATIENT)
Dept: MEDICAL GROUP | Facility: CLINIC | Age: 25
End: 2025-08-04
Payer: COMMERCIAL

## 2025-08-04 VITALS
DIASTOLIC BLOOD PRESSURE: 74 MMHG | RESPIRATION RATE: 18 BRPM | OXYGEN SATURATION: 94 % | TEMPERATURE: 97.8 F | WEIGHT: 227.07 LBS | BODY MASS INDEX: 38.77 KG/M2 | SYSTOLIC BLOOD PRESSURE: 110 MMHG | HEIGHT: 64 IN | HEART RATE: 71 BPM

## 2025-08-04 DIAGNOSIS — R07.89 OTHER CHEST PAIN: ICD-10-CM

## 2025-08-04 DIAGNOSIS — J45.20 MILD INTERMITTENT ASTHMA WITHOUT COMPLICATION: ICD-10-CM

## 2025-08-04 DIAGNOSIS — E03.9 ACQUIRED HYPOTHYROIDISM: Primary | ICD-10-CM

## 2025-08-04 DIAGNOSIS — J30.2 SEASONAL ALLERGIES: ICD-10-CM

## 2025-08-04 PROCEDURE — 3078F DIAST BP <80 MM HG: CPT | Performed by: PHYSICIAN ASSISTANT

## 2025-08-04 PROCEDURE — 99214 OFFICE O/P EST MOD 30 MIN: CPT | Performed by: PHYSICIAN ASSISTANT

## 2025-08-04 PROCEDURE — 93000 ELECTROCARDIOGRAM COMPLETE: CPT | Performed by: PHYSICIAN ASSISTANT

## 2025-08-04 PROCEDURE — 3074F SYST BP LT 130 MM HG: CPT | Performed by: PHYSICIAN ASSISTANT

## 2025-08-04 RX ORDER — ALBUTEROL SULFATE 90 UG/1
1-2 INHALANT RESPIRATORY (INHALATION) EVERY 6 HOURS PRN
Qty: 18 G | Refills: 3 | Status: SHIPPED | OUTPATIENT
Start: 2025-08-04

## 2025-08-04 RX ORDER — BUDESONIDE AND FORMOTEROL FUMARATE DIHYDRATE 80; 4.5 UG/1; UG/1
2 AEROSOL RESPIRATORY (INHALATION) 2 TIMES DAILY
Qty: 10.2 G | Refills: 2 | Status: SHIPPED | OUTPATIENT
Start: 2025-08-04

## 2025-08-04 ASSESSMENT — FIBROSIS 4 INDEX: FIB4 SCORE: 0.3

## 2025-08-05 ENCOUNTER — PATIENT MESSAGE (OUTPATIENT)
Dept: MEDICAL GROUP | Facility: CLINIC | Age: 25
End: 2025-08-05
Payer: COMMERCIAL

## 2025-08-05 DIAGNOSIS — J30.2 SEASONAL ALLERGIES: Primary | ICD-10-CM

## 2025-08-06 RX ORDER — MOMETASONE FUROATE MONOHYDRATE 50 UG/1
2 SPRAY, METERED NASAL DAILY
Qty: 3 EACH | Refills: 2 | Status: SHIPPED | OUTPATIENT
Start: 2025-08-06